# Patient Record
Sex: FEMALE | Race: BLACK OR AFRICAN AMERICAN | NOT HISPANIC OR LATINO
[De-identification: names, ages, dates, MRNs, and addresses within clinical notes are randomized per-mention and may not be internally consistent; named-entity substitution may affect disease eponyms.]

---

## 2019-03-11 PROBLEM — Z00.00 ENCOUNTER FOR PREVENTIVE HEALTH EXAMINATION: Status: ACTIVE | Noted: 2019-03-11

## 2019-03-13 ENCOUNTER — APPOINTMENT (OUTPATIENT)
Dept: ORTHOPEDIC SURGERY | Facility: CLINIC | Age: 44
End: 2019-03-13
Payer: COMMERCIAL

## 2019-03-13 VITALS
BODY MASS INDEX: 39.55 KG/M2 | WEIGHT: 252 LBS | HEART RATE: 80 BPM | HEIGHT: 67 IN | SYSTOLIC BLOOD PRESSURE: 144 MMHG | DIASTOLIC BLOOD PRESSURE: 83 MMHG

## 2019-03-13 DIAGNOSIS — Z78.9 OTHER SPECIFIED HEALTH STATUS: ICD-10-CM

## 2019-03-13 PROCEDURE — 72110 X-RAY EXAM L-2 SPINE 4/>VWS: CPT

## 2019-03-13 PROCEDURE — 99204 OFFICE O/P NEW MOD 45 MIN: CPT

## 2019-03-13 PROCEDURE — 72170 X-RAY EXAM OF PELVIS: CPT | Mod: 59

## 2019-03-26 ENCOUNTER — APPOINTMENT (OUTPATIENT)
Dept: ORTHOPEDIC SURGERY | Facility: CLINIC | Age: 44
End: 2019-03-26
Payer: COMMERCIAL

## 2019-03-26 VITALS
HEIGHT: 67 IN | HEART RATE: 85 BPM | WEIGHT: 250 LBS | BODY MASS INDEX: 39.24 KG/M2 | DIASTOLIC BLOOD PRESSURE: 108 MMHG | SYSTOLIC BLOOD PRESSURE: 161 MMHG

## 2019-03-26 PROCEDURE — 73502 X-RAY EXAM HIP UNI 2-3 VIEWS: CPT

## 2019-03-26 PROCEDURE — 99214 OFFICE O/P EST MOD 30 MIN: CPT

## 2019-04-01 ENCOUNTER — APPOINTMENT (OUTPATIENT)
Dept: ORTHOPEDIC SURGERY | Facility: CLINIC | Age: 44
End: 2019-04-01
Payer: COMMERCIAL

## 2019-04-01 ENCOUNTER — CHART COPY (OUTPATIENT)
Age: 44
End: 2019-04-01

## 2019-04-01 VITALS
HEART RATE: 82 BPM | HEIGHT: 67 IN | WEIGHT: 250 LBS | BODY MASS INDEX: 39.24 KG/M2 | SYSTOLIC BLOOD PRESSURE: 138 MMHG | DIASTOLIC BLOOD PRESSURE: 80 MMHG

## 2019-04-01 PROCEDURE — 99213 OFFICE O/P EST LOW 20 MIN: CPT

## 2019-09-12 ENCOUNTER — CHART COPY (OUTPATIENT)
Age: 44
End: 2019-09-12

## 2019-09-13 ENCOUNTER — CHART COPY (OUTPATIENT)
Age: 44
End: 2019-09-13

## 2019-11-05 ENCOUNTER — CHART COPY (OUTPATIENT)
Age: 44
End: 2019-11-05

## 2020-03-26 ENCOUNTER — TRANSCRIPTION ENCOUNTER (OUTPATIENT)
Age: 45
End: 2020-03-26

## 2020-06-15 ENCOUNTER — APPOINTMENT (OUTPATIENT)
Dept: ORTHOPEDIC SURGERY | Facility: CLINIC | Age: 45
End: 2020-06-15
Payer: COMMERCIAL

## 2020-06-15 VITALS — BODY MASS INDEX: 39.24 KG/M2 | TEMPERATURE: 97.8 F | WEIGHT: 250 LBS | HEIGHT: 67 IN

## 2020-06-15 PROCEDURE — 99213 OFFICE O/P EST LOW 20 MIN: CPT

## 2020-06-15 PROCEDURE — 72110 X-RAY EXAM L-2 SPINE 4/>VWS: CPT

## 2020-06-15 PROCEDURE — 72170 X-RAY EXAM OF PELVIS: CPT | Mod: 59

## 2020-06-15 RX ORDER — HYDROCHLOROTHIAZIDE 25 MG/1
25 TABLET ORAL
Qty: 90 | Refills: 0 | Status: ACTIVE | COMMUNITY
Start: 2020-04-14

## 2020-06-15 RX ORDER — HYDROCODONE BITARTRATE AND ACETAMINOPHEN 7.5; 325 MG/1; MG/1
7.5-325 TABLET ORAL
Qty: 16 | Refills: 0 | Status: COMPLETED | COMMUNITY
Start: 2019-12-22

## 2020-06-15 RX ORDER — TERCONAZOLE 8 MG/G
0.8 CREAM VAGINAL
Qty: 20 | Refills: 0 | Status: COMPLETED | COMMUNITY
Start: 2020-02-17

## 2020-06-15 RX ORDER — LEVOTHYROXINE SODIUM 0.03 MG/1
25 TABLET ORAL
Qty: 90 | Refills: 0 | Status: ACTIVE | COMMUNITY
Start: 2020-04-18

## 2020-06-15 RX ORDER — TIZANIDINE 4 MG/1
4 TABLET ORAL
Qty: 45 | Refills: 0 | Status: ACTIVE | COMMUNITY
Start: 2020-05-26

## 2020-06-15 RX ORDER — DICLOFENAC SODIUM 75 MG/1
75 TABLET, DELAYED RELEASE ORAL
Qty: 60 | Refills: 1 | Status: ACTIVE | COMMUNITY
Start: 2020-06-15 | End: 1900-01-01

## 2020-06-29 ENCOUNTER — RESULT REVIEW (OUTPATIENT)
Age: 45
End: 2020-06-29

## 2020-06-29 ENCOUNTER — OUTPATIENT (OUTPATIENT)
Dept: OUTPATIENT SERVICES | Facility: HOSPITAL | Age: 45
LOS: 1 days | End: 2020-06-29
Payer: COMMERCIAL

## 2020-06-29 ENCOUNTER — APPOINTMENT (OUTPATIENT)
Dept: MRI IMAGING | Facility: CLINIC | Age: 45
End: 2020-06-29
Payer: COMMERCIAL

## 2020-06-29 DIAGNOSIS — M25.551 PAIN IN RIGHT HIP: ICD-10-CM

## 2020-06-29 PROCEDURE — 73721 MRI JNT OF LWR EXTRE W/O DYE: CPT | Mod: 26,RT

## 2020-06-29 PROCEDURE — 73721 MRI JNT OF LWR EXTRE W/O DYE: CPT

## 2020-07-08 ENCOUNTER — APPOINTMENT (OUTPATIENT)
Dept: ORTHOPEDIC SURGERY | Facility: CLINIC | Age: 45
End: 2020-07-08
Payer: COMMERCIAL

## 2020-07-08 VITALS — BODY MASS INDEX: 39.24 KG/M2 | WEIGHT: 250 LBS | HEIGHT: 67 IN | TEMPERATURE: 98 F

## 2020-07-08 DIAGNOSIS — M43.17 SPONDYLOLISTHESIS, LUMBOSACRAL REGION: ICD-10-CM

## 2020-07-08 PROCEDURE — 99214 OFFICE O/P EST MOD 30 MIN: CPT

## 2020-07-28 ENCOUNTER — APPOINTMENT (OUTPATIENT)
Dept: ORTHOPEDIC SURGERY | Facility: CLINIC | Age: 45
End: 2020-07-28

## 2020-08-16 ENCOUNTER — OUTPATIENT (OUTPATIENT)
Dept: OUTPATIENT SERVICES | Facility: HOSPITAL | Age: 45
LOS: 1 days | End: 2020-08-16
Payer: COMMERCIAL

## 2020-08-16 DIAGNOSIS — Z11.59 ENCOUNTER FOR SCREENING FOR OTHER VIRAL DISEASES: ICD-10-CM

## 2020-08-16 PROCEDURE — U0003: CPT

## 2020-08-17 LAB — SARS-COV-2 RNA SPEC QL NAA+PROBE: SIGNIFICANT CHANGE UP

## 2020-08-18 ENCOUNTER — OUTPATIENT (OUTPATIENT)
Dept: OUTPATIENT SERVICES | Facility: HOSPITAL | Age: 45
LOS: 1 days | End: 2020-08-18
Payer: COMMERCIAL

## 2020-08-18 DIAGNOSIS — M54.16 RADICULOPATHY, LUMBAR REGION: ICD-10-CM

## 2020-08-18 PROCEDURE — 77003 FLUOROGUIDE FOR SPINE INJECT: CPT

## 2020-08-18 PROCEDURE — 62323 NJX INTERLAMINAR LMBR/SAC: CPT

## 2021-11-30 ENCOUNTER — APPOINTMENT (OUTPATIENT)
Dept: ORTHOPEDIC SURGERY | Facility: CLINIC | Age: 46
End: 2021-11-30

## 2021-12-03 ENCOUNTER — EMERGENCY (EMERGENCY)
Facility: HOSPITAL | Age: 46
LOS: 1 days | Discharge: HOME | End: 2021-12-03
Attending: EMERGENCY MEDICINE | Admitting: EMERGENCY MEDICINE
Payer: COMMERCIAL

## 2021-12-03 VITALS
OXYGEN SATURATION: 100 % | DIASTOLIC BLOOD PRESSURE: 95 MMHG | SYSTOLIC BLOOD PRESSURE: 159 MMHG | RESPIRATION RATE: 16 BRPM | TEMPERATURE: 98 F | HEART RATE: 81 BPM

## 2021-12-03 LAB
ALBUMIN SERPL ELPH-MCNC: 4 G/DL — SIGNIFICANT CHANGE UP (ref 3.3–5)
ALP SERPL-CCNC: 47 U/L — SIGNIFICANT CHANGE UP (ref 40–120)
ALT FLD-CCNC: 11 U/L — SIGNIFICANT CHANGE UP (ref 4–33)
ANION GAP SERPL CALC-SCNC: 9 MMOL/L — SIGNIFICANT CHANGE UP (ref 7–14)
AST SERPL-CCNC: 13 U/L — SIGNIFICANT CHANGE UP (ref 4–32)
BASOPHILS # BLD AUTO: 0.03 K/UL — SIGNIFICANT CHANGE UP (ref 0–0.2)
BASOPHILS NFR BLD AUTO: 0.4 % — SIGNIFICANT CHANGE UP (ref 0–2)
BILIRUB SERPL-MCNC: 0.3 MG/DL — SIGNIFICANT CHANGE UP (ref 0.2–1.2)
BUN SERPL-MCNC: 13 MG/DL — SIGNIFICANT CHANGE UP (ref 7–23)
CALCIUM SERPL-MCNC: 10 MG/DL — SIGNIFICANT CHANGE UP (ref 8.4–10.5)
CHLORIDE SERPL-SCNC: 103 MMOL/L — SIGNIFICANT CHANGE UP (ref 98–107)
CO2 SERPL-SCNC: 24 MMOL/L — SIGNIFICANT CHANGE UP (ref 22–31)
CREAT SERPL-MCNC: 0.69 MG/DL — SIGNIFICANT CHANGE UP (ref 0.5–1.3)
EOSINOPHIL # BLD AUTO: 0.11 K/UL — SIGNIFICANT CHANGE UP (ref 0–0.5)
EOSINOPHIL NFR BLD AUTO: 1.5 % — SIGNIFICANT CHANGE UP (ref 0–6)
GLUCOSE SERPL-MCNC: 85 MG/DL — SIGNIFICANT CHANGE UP (ref 70–99)
HCT VFR BLD CALC: 34.6 % — SIGNIFICANT CHANGE UP (ref 34.5–45)
HGB BLD-MCNC: 11.7 G/DL — SIGNIFICANT CHANGE UP (ref 11.5–15.5)
IANC: 3.66 K/UL — SIGNIFICANT CHANGE UP (ref 1.5–8.5)
IMM GRANULOCYTES NFR BLD AUTO: 0.3 % — SIGNIFICANT CHANGE UP (ref 0–1.5)
LIDOCAIN IGE QN: 22 U/L — SIGNIFICANT CHANGE UP (ref 7–60)
LYMPHOCYTES # BLD AUTO: 2.96 K/UL — SIGNIFICANT CHANGE UP (ref 1–3.3)
LYMPHOCYTES # BLD AUTO: 39.6 % — SIGNIFICANT CHANGE UP (ref 13–44)
MCHC RBC-ENTMCNC: 31.3 PG — SIGNIFICANT CHANGE UP (ref 27–34)
MCHC RBC-ENTMCNC: 33.8 GM/DL — SIGNIFICANT CHANGE UP (ref 32–36)
MCV RBC AUTO: 92.5 FL — SIGNIFICANT CHANGE UP (ref 80–100)
MONOCYTES # BLD AUTO: 0.69 K/UL — SIGNIFICANT CHANGE UP (ref 0–0.9)
MONOCYTES NFR BLD AUTO: 9.2 % — SIGNIFICANT CHANGE UP (ref 2–14)
NEUTROPHILS # BLD AUTO: 3.66 K/UL — SIGNIFICANT CHANGE UP (ref 1.8–7.4)
NEUTROPHILS NFR BLD AUTO: 49 % — SIGNIFICANT CHANGE UP (ref 43–77)
NRBC # BLD: 0 /100 WBCS — SIGNIFICANT CHANGE UP
NRBC # FLD: 0 K/UL — SIGNIFICANT CHANGE UP
PLATELET # BLD AUTO: 216 K/UL — SIGNIFICANT CHANGE UP (ref 150–400)
POTASSIUM SERPL-MCNC: 3.9 MMOL/L — SIGNIFICANT CHANGE UP (ref 3.5–5.3)
POTASSIUM SERPL-SCNC: 3.9 MMOL/L — SIGNIFICANT CHANGE UP (ref 3.5–5.3)
PROT SERPL-MCNC: 7.5 G/DL — SIGNIFICANT CHANGE UP (ref 6–8.3)
RBC # BLD: 3.74 M/UL — LOW (ref 3.8–5.2)
RBC # FLD: 13 % — SIGNIFICANT CHANGE UP (ref 10.3–14.5)
SODIUM SERPL-SCNC: 136 MMOL/L — SIGNIFICANT CHANGE UP (ref 135–145)
TROPONIN T, HIGH SENSITIVITY RESULT: <6 NG/L — SIGNIFICANT CHANGE UP
TSH SERPL-MCNC: 24.18 UIU/ML — HIGH (ref 0.27–4.2)
WBC # BLD: 7.47 K/UL — SIGNIFICANT CHANGE UP (ref 3.8–10.5)
WBC # FLD AUTO: 7.47 K/UL — SIGNIFICANT CHANGE UP (ref 3.8–10.5)

## 2021-12-03 PROCEDURE — 76705 ECHO EXAM OF ABDOMEN: CPT | Mod: 26

## 2021-12-03 PROCEDURE — 99285 EMERGENCY DEPT VISIT HI MDM: CPT | Mod: 25

## 2021-12-03 PROCEDURE — 93010 ELECTROCARDIOGRAM REPORT: CPT

## 2021-12-03 RX ORDER — ONDANSETRON 8 MG/1
4 TABLET, FILM COATED ORAL ONCE
Refills: 0 | Status: COMPLETED | OUTPATIENT
Start: 2021-12-03 | End: 2021-12-03

## 2021-12-03 RX ORDER — KETOROLAC TROMETHAMINE 30 MG/ML
15 SYRINGE (ML) INJECTION ONCE
Refills: 0 | Status: DISCONTINUED | OUTPATIENT
Start: 2021-12-03 | End: 2021-12-03

## 2021-12-03 RX ORDER — SODIUM CHLORIDE 9 MG/ML
1000 INJECTION INTRAMUSCULAR; INTRAVENOUS; SUBCUTANEOUS ONCE
Refills: 0 | Status: COMPLETED | OUTPATIENT
Start: 2021-12-03 | End: 2021-12-03

## 2021-12-03 RX ORDER — FAMOTIDINE 10 MG/ML
20 INJECTION INTRAVENOUS ONCE
Refills: 0 | Status: COMPLETED | OUTPATIENT
Start: 2021-12-03 | End: 2021-12-03

## 2021-12-03 RX ADMIN — Medication 15 MILLIGRAM(S): at 22:41

## 2021-12-03 RX ADMIN — FAMOTIDINE 20 MILLIGRAM(S): 10 INJECTION INTRAVENOUS at 20:51

## 2021-12-03 RX ADMIN — SODIUM CHLORIDE 1000 MILLILITER(S): 9 INJECTION INTRAMUSCULAR; INTRAVENOUS; SUBCUTANEOUS at 20:49

## 2021-12-03 RX ADMIN — Medication 30 MILLILITER(S): at 20:50

## 2021-12-03 RX ADMIN — ONDANSETRON 4 MILLIGRAM(S): 8 TABLET, FILM COATED ORAL at 20:51

## 2021-12-03 NOTE — ED PROVIDER NOTE - OBJECTIVE STATEMENT
47 y/o female pmh htn, hypothyroidism, obesity c/o abd pain x2 months. Pt admits to intermittent epigastric pain, worse with eating and drinking, associated with nausea. Pt states that today the pain was much worse and constant. Pt states that she now has chest tightness and palpitations w/ the pain. Pt denies sob, v/d, dysuria, hematuria, vaginal bleeding, numbness, tingling, weakness, dizziness, syncope, fever or chills. 47 y/o female pmh htn, hypothyroidism, obesity c/o abd pain x2 months, worse today. Pt admits to intermittent epigastric pain, worse with eating and drinking, associated with nausea. Pt states that today the pain was much worse and constant. Pt states that she now has chest tightness and palpitations w/ the pain. Pt denies sob, v/d, dysuria, hematuria, vaginal bleeding, numbness, tingling, weakness, dizziness, syncope, fever or chills.

## 2021-12-03 NOTE — ED ADULT TRIAGE NOTE - CHIEF COMPLAINT QUOTE
Problem: Patient Care Overview  Goal: Plan of Care Review  Outcome: Ongoing (interventions implemented as appropriate)   11/07/19 4980   Coping/Psychosocial   Plan of Care Reviewed With patient   Plan of Care Review   Progress improving   OTHER   Outcome Summary PATIENT WAS ADMITTED 11/6/19 WITH ABD PAIN N/V. PT C/O HEADACHE THIS PM WITH NEW ORDER FOR TYLENOL. PATIENT DENIES ABD PAIN, N/V. CALL LIGHT IN REACH. NEEDS MET AT THIS TIME.,       Problem: Nausea/Vomiting (Adult)  Goal: Identify Related Risk Factors and Signs and Symptoms  Outcome: Ongoing (interventions implemented as appropriate)         c/o mid upper abdominal pain with chest tightness, nausea/vomiting and palpitations. Pt has been having abdominal pain x 2 months but palpitations and chest tightness is new today. Hx hyperthyroid, HTN

## 2021-12-03 NOTE — ED PROVIDER NOTE - ATTENDING CONTRIBUTION TO CARE
Agree with above- pt presents with intermittent epigastric pain with nausea, worse with eating, worse today. Pt appears uncomfortable, +epigastric TTP, soft, no r/g. Pt feels like the pain radiates up to her chest. Has long h/o palpitations, neg holter monitor in past. On EKG, pt with frequent PVC. Abs symptoms concerning for PUD, GERD, cholelithiasis/cholecystitis. Plan for labs, RUQ US, likely dispo If w/u normal and pain resolved- will arrange for GI.

## 2021-12-03 NOTE — ED PROVIDER NOTE - CLINICAL SUMMARY MEDICAL DECISION MAKING FREE TEXT BOX
45 y/o female pmh htn, hypothyroidism c/o epigastric pain/chest pain- labs, US< pain control, ekg, reassess

## 2021-12-03 NOTE — ED ADULT NURSE NOTE - OBJECTIVE STATEMENT
patient came to Er with c/o mid upper abdominal pain for 2 months  pain is worse today. also c/o epigastric pain worse with eating and drinking and nausea. pt also c/o chest tightness and palpitation from today. alert and oriented. 20 g iv started on right ac. meds given. awaiting for ultrasound

## 2021-12-03 NOTE — ED ADULT NURSE NOTE - CHIEF COMPLAINT QUOTE
c/o mid upper abdominal pain with chest tightness, nausea/vomiting and palpitations. Pt has been having abdominal pain x 2 months but palpitations and chest tightness is new today. Hx hyperthyroid, HTN

## 2021-12-04 VITALS
SYSTOLIC BLOOD PRESSURE: 137 MMHG | OXYGEN SATURATION: 100 % | RESPIRATION RATE: 16 BRPM | HEART RATE: 54 BPM | DIASTOLIC BLOOD PRESSURE: 73 MMHG | TEMPERATURE: 98 F

## 2021-12-04 DIAGNOSIS — Z98.891 HISTORY OF UTERINE SCAR FROM PREVIOUS SURGERY: Chronic | ICD-10-CM

## 2021-12-04 PROCEDURE — G1004: CPT

## 2021-12-04 PROCEDURE — 99236 HOSP IP/OBS SAME DATE HI 85: CPT

## 2021-12-04 PROCEDURE — 78226 HEPATOBILIARY SYSTEM IMAGING: CPT | Mod: 26,GC,MG

## 2021-12-04 RX ORDER — LEVOTHYROXINE SODIUM 125 MCG
50 TABLET ORAL DAILY
Refills: 0 | Status: DISCONTINUED | OUTPATIENT
Start: 2021-12-04 | End: 2021-12-07

## 2021-12-04 RX ORDER — KETOROLAC TROMETHAMINE 30 MG/ML
15 SYRINGE (ML) INJECTION ONCE
Refills: 0 | Status: DISCONTINUED | OUTPATIENT
Start: 2021-12-04 | End: 2021-12-04

## 2021-12-04 RX ORDER — METOPROLOL TARTRATE 50 MG
50 TABLET ORAL DAILY
Refills: 0 | Status: DISCONTINUED | OUTPATIENT
Start: 2021-12-04 | End: 2021-12-07

## 2021-12-04 RX ORDER — HYDROCHLOROTHIAZIDE 25 MG
25 TABLET ORAL DAILY
Refills: 0 | Status: DISCONTINUED | OUTPATIENT
Start: 2021-12-04 | End: 2021-12-07

## 2021-12-04 RX ADMIN — Medication 15 MILLIGRAM(S): at 04:19

## 2021-12-04 RX ADMIN — Medication 25 MILLIGRAM(S): at 05:43

## 2021-12-04 RX ADMIN — Medication 50 MICROGRAM(S): at 05:42

## 2021-12-04 RX ADMIN — Medication 15 MILLIGRAM(S): at 15:00

## 2021-12-04 RX ADMIN — Medication 15 MILLIGRAM(S): at 04:34

## 2021-12-04 RX ADMIN — Medication 15 MILLIGRAM(S): at 14:30

## 2021-12-04 RX ADMIN — Medication 50 MILLIGRAM(S): at 05:43

## 2021-12-04 NOTE — ED CDU PROVIDER INITIAL DAY NOTE - OBJECTIVE STATEMENT
45 y/o female with a hx of HTN, Hypothyroidism, presented to the ER c/o 2 months of RUQ pain.  Pain is worse with eating.  Pt states pain increased today.  Pt reported chest tightness and palpitations with the pain which resolved.  Pt denies fevers, chills, cough, weakness, dizziness, back pain.

## 2021-12-04 NOTE — ED CDU PROVIDER INITIAL DAY NOTE - MEDICAL DECISION MAKING DETAILS
45 y/o female with a hx of HTN, Hypothyroidism, presented to the ER c/o 2 months of RUQ pain.  Pain is worse with eating.  Pt placed in CDU for pain control, surgery consult, HIDA scan.

## 2021-12-04 NOTE — ED CDU PROVIDER DISPOSITION NOTE - PATIENT PORTAL LINK FT
You can access the FollowMyHealth Patient Portal offered by [facility] by registering at the following website: [email or web address]. By joining [employer]’s FollowMyHealth portal, you will also be able to view your health information using other applications (apps) compatible with our system.

## 2021-12-04 NOTE — ED CDU PROVIDER INITIAL DAY NOTE - WET READ LAUNCH FT
Met with patient discussed role of ACre Management  Patient reside with his wife and family in a 2 story home with 0 RUPAL  Ailyn Thompson He uses no assistive device and is employed  He has Home O2 thru [employer]  He plans to return home and anticipates no discharge needs  There are no Wet Read(s) to document.

## 2021-12-04 NOTE — ED CDU PROVIDER INITIAL DAY NOTE - PROGRESS NOTE DETAILS
HIDA negative, pt started to have 7/10 upper abdominal pain again, surgery consulted- states unlikely cholecystitis and recommended GI follow up.  Pt tolerated PO.  Noted to have PVCs on EKG- will refer to cardiology for follow up.

## 2021-12-04 NOTE — CONSULT NOTE ADULT - SUBJECTIVE AND OBJECTIVE BOX
GENERAL SURGERY CONSULT NOTE  Consulting surgical team: B Team Surgery  Consulting attending: Dr. Mcgee    HPI:  47yo F with Hx HTN, hypothyroidism, obesity who presented with severe epigastric abdominal pain for 1 day. She states that she has had epigastric pain for 2 months, but it acutely worsened yesterday at 830am. The pain was sharp, constant, and non-radiating. Her pain was associated with nausea and she made herself throw up once, without relief; her emesis was bilious, but non-bloody. She has never had any similar episodes in the past. She does not have diarrhea and does not have sick contacts. She did not eat anything out of the ordinary yesterday. She has never had previous episodes of diverticulitis and has never been diagnosed with peptic ulcer disease. She has not had an endoscopy or colonoscopy in the past. She is passing flatus and having BM. On presentation, patient was afebrile, hemodynamically stable. Labs within normal limits (WBC 8.65, LFTs WNL). RUQ u/s demonstrated cholelithiasis without evidence of acute cholecystitis. HIDA was also negative for acute cholecystitis.     PAST MEDICAL HISTORY:  HTN (hypertension)  Hypothyroidism    PAST SURGICAL HISTORY:  S/P     MEDICATIONS:  hydrochlorothiazide 25 milliGRAM(s) Oral daily  levothyroxine 50 MICROGram(s) Oral daily  metoprolol succinate ER 50 milliGRAM(s) Oral daily    ALLERGIES:  gabapentin (Unknown)    VITALS & I/Os:  Vital Signs Last 24 Hrs  T(C): 36.7 (04 Dec 2021 17:54), Max: 37.1 (04 Dec 2021 05:35)  T(F): 98.1 (04 Dec 2021 17:54), Max: 98.7 (04 Dec 2021 05:35)  HR: 54 (04 Dec 2021 17:54) (53 - 87)  BP: 137/73 (04 Dec 2021 17:54) (114/53 - 159/95)  RR: 16 (04 Dec 2021 17:54) (15 - 16)  SpO2: 100% (04 Dec 2021 17:54) (100% - 100%)        PHYSICAL EXAM:  GEN: resting in bed comfortably in NAD  NEURO: awake, alert  CV: warm, well-perfused  RESP: no increased WOB  ABD: soft, non-distended, tender in the epigastric region and b/l LQ only without rebound tenderness or guarding; negative Ware's sign, NO RUQ tenderness  EXTR: no gross deformities; spontaneous movement in b/l U/L extrem     LABS:             11.7   7.47  )-----------( 216      ( 03 Dec 2021 21:02 )             34.6     136  |  103  |  13  ----------------------------<  85  3.9   |  24  |  0.69    Ca    10.0      03 Dec 2021 21:02    TPro  7.5  /  Alb  4.0  /  TBili  0.3  /  DBili  x   /  AST  13  /  ALT  11  /  AlkPhos  47  12-03      IMAGING:    RUQ u/s - 12/3:   FINDINGS:  Liver: Within normal limits.  Bile ducts: Normal caliber. Common bile duct measures 2 mm.  Gallbladder: Cholelithiasis. Normal gallbladder wall thickness. Negative sonographic Ware sign.  Pancreas: Poorly visualized.  Right kidney: 10.6 cm. No hydronephrosis.  Ascites: None.  IVC: Visualized portions are within normal limits.    IMPRESSION:  Cholelithiasis without sonographic evidence for acute cholecystitis.    HIDA - :   FINDINGS: There is prompt, homogeneous uptake of radiotracer by the hepatocytes. Activity is first seen in the gallbladder and bowel at 30 minutes. There is good clearance of activity from the liver by the end of the study.    IMPRESSION: Hepatobiliary scan demonstrates:  No evidence of acute cholecystitis.

## 2021-12-04 NOTE — ED CDU PROVIDER INITIAL DAY NOTE - DETAILS
47 y/o female with a hx of HTN, Hypothyroidism, presented to the ER c/o 2 months of RUQ pain.  Pain is worse with eating.  Pt placed in CDU for pain control, surgery consult, HIDA scan.

## 2021-12-04 NOTE — CONSULT NOTE ADULT - ASSESSMENT
45yo F with Hx HTN, hypothyroidism, obesity who presented with severe epigastric abdominal pain for 1 day, without evidence of acute cholecystitis. General Surgery consulted to evaluate for acute cholecystitis. As patient does not have RUQ pain, has no fever or elevated WBC, and does not have imaging evidence of acute cholecystitis, patient is unlikely to have cholecystitis at this time. Differential diagnosis includes peptic ulcer disease.     PLAN:   - No acute surgical intervention at this time  - No contraindications to discharge if patient can tolerate PO diet at this time  - Recommend OP f/u with GI for possible endoscopy and workup to evaluate for peptic ulcer disease    Discussed with Dr. Talamantes, PGY-2  B Team Surgery  #[id number]

## 2021-12-04 NOTE — ED CDU PROVIDER DISPOSITION NOTE - ATTENDING CONTRIBUTION TO CARE
47 y/o female with a hx of HTN, Hypothyroidism, presented to the ER c/o 2 months of RUQ pain.  Pain is worse with eating.  Pt placed in CDU for pain control, surgery consult, HIDA scan.  HIDA was negative, pt cleared by surgery for discharge and follow up with GI outpatient.

## 2021-12-04 NOTE — ED CDU PROVIDER DISPOSITION NOTE - NSFOLLOWUPINSTRUCTIONS_ED_ALL_ED_FT
Avoid any fatty, greasy foods.  You may take pepcid 20 mg 2x/day.  Follow up with a GI specialist within 1 week.  Follow up with a cardiologist within one week.  Someone will call you to set up appointments.  Return to ED for any worsening abdominal pain, vomiting, fever, chest pain, shortness of breath.

## 2021-12-08 PROBLEM — E03.9 HYPOTHYROIDISM, UNSPECIFIED: Chronic | Status: ACTIVE | Noted: 2021-12-03

## 2021-12-08 PROBLEM — I10 ESSENTIAL (PRIMARY) HYPERTENSION: Chronic | Status: ACTIVE | Noted: 2021-12-03

## 2021-12-10 DIAGNOSIS — R10.13 EPIGASTRIC PAIN: ICD-10-CM

## 2021-12-13 ENCOUNTER — NON-APPOINTMENT (OUTPATIENT)
Age: 46
End: 2021-12-13

## 2021-12-13 ENCOUNTER — APPOINTMENT (OUTPATIENT)
Dept: GASTROENTEROLOGY | Facility: CLINIC | Age: 46
End: 2021-12-13
Payer: COMMERCIAL

## 2021-12-13 ENCOUNTER — APPOINTMENT (OUTPATIENT)
Dept: CARDIOLOGY | Facility: CLINIC | Age: 46
End: 2021-12-13
Payer: COMMERCIAL

## 2021-12-13 VITALS
WEIGHT: 252 LBS | SYSTOLIC BLOOD PRESSURE: 122 MMHG | DIASTOLIC BLOOD PRESSURE: 78 MMHG | BODY MASS INDEX: 39.55 KG/M2 | HEIGHT: 67 IN | HEART RATE: 55 BPM

## 2021-12-13 VITALS
DIASTOLIC BLOOD PRESSURE: 86 MMHG | HEART RATE: 52 BPM | SYSTOLIC BLOOD PRESSURE: 129 MMHG | TEMPERATURE: 97.5 F | BODY MASS INDEX: 32.18 KG/M2 | RESPIRATION RATE: 16 BRPM | WEIGHT: 205 LBS | HEIGHT: 67 IN | OXYGEN SATURATION: 100 %

## 2021-12-13 DIAGNOSIS — I49.3 VENTRICULAR PREMATURE DEPOLARIZATION: ICD-10-CM

## 2021-12-13 DIAGNOSIS — E03.9 HYPOTHYROIDISM, UNSPECIFIED: ICD-10-CM

## 2021-12-13 DIAGNOSIS — I10 ESSENTIAL (PRIMARY) HYPERTENSION: ICD-10-CM

## 2021-12-13 DIAGNOSIS — R94.31 ABNORMAL ELECTROCARDIOGRAM [ECG] [EKG]: ICD-10-CM

## 2021-12-13 DIAGNOSIS — E66.9 OBESITY, UNSPECIFIED: ICD-10-CM

## 2021-12-13 DIAGNOSIS — Z12.11 ENCOUNTER FOR SCREENING FOR MALIGNANT NEOPLASM OF COLON: ICD-10-CM

## 2021-12-13 DIAGNOSIS — R00.2 PALPITATIONS: ICD-10-CM

## 2021-12-13 DIAGNOSIS — Z78.9 OTHER SPECIFIED HEALTH STATUS: ICD-10-CM

## 2021-12-13 DIAGNOSIS — L80 VITILIGO: ICD-10-CM

## 2021-12-13 PROCEDURE — 82274 ASSAY TEST FOR BLOOD FECAL: CPT | Mod: QW

## 2021-12-13 PROCEDURE — 99204 OFFICE O/P NEW MOD 45 MIN: CPT

## 2021-12-13 PROCEDURE — 93000 ELECTROCARDIOGRAM COMPLETE: CPT

## 2021-12-13 RX ORDER — AMOXICILLIN AND CLAVULANATE POTASSIUM 875; 125 MG/1; MG/1
875-125 TABLET, COATED ORAL
Qty: 20 | Refills: 0 | Status: DISCONTINUED | COMMUNITY
Start: 2020-01-30 | End: 2021-12-13

## 2021-12-13 RX ORDER — METOPROLOL SUCCINATE 50 MG/1
50 TABLET, EXTENDED RELEASE ORAL
Qty: 90 | Refills: 0 | Status: DISCONTINUED | COMMUNITY
Start: 2020-04-14 | End: 2021-12-13

## 2021-12-13 RX ORDER — DICLOFENAC SODIUM 75 MG/1
75 TABLET, DELAYED RELEASE ORAL
Qty: 30 | Refills: 1 | Status: DISCONTINUED | COMMUNITY
Start: 2019-03-13 | End: 2021-12-13

## 2021-12-13 RX ORDER — FLUCONAZOLE 150 MG/1
150 TABLET ORAL
Qty: 3 | Refills: 0 | Status: DISCONTINUED | COMMUNITY
Start: 2020-02-17 | End: 2021-12-13

## 2021-12-13 RX ORDER — OSELTAMIVIR PHOSPHATE 75 MG/1
75 CAPSULE ORAL
Qty: 10 | Refills: 0 | Status: DISCONTINUED | COMMUNITY
Start: 2020-01-30 | End: 2021-12-13

## 2021-12-13 NOTE — ASSESSMENT
[FreeTextEntry1] : 1.  Cholelithiasis-recent attack of biliary colic without evidence for acute cholecystitis.\par 2.  Colon cancer screening-rule out colon polyps.\par 3.  Hypertension.\par 4.  Obesity.\par 5.  Hypothyroidism with thyromegaly.\par 6.  History of kidney stones.\par 7.  History of PVCs.\par \par Plan:\par 1.  Blood test results from [facility] were reviewed.\par 2.  The patient was advised to monitor her symptoms, and if she continues to have recurrent attacks of biliary pain, an elective cholecystectomy would be advised.\par 3.  The patient was advised to schedule a colonoscopy.  The procedure, material risks (including bleeding, perforation, anesthesia-related complications, rare complications, death and risk of missing a lesion), benefits (including finding a polyp, cancer, inflammatory bowel disease or other lesions) and alternatives (including stool testing and imaging) were discussed with the patient at length.  The [employer] Brochure was given. The MiraLax preparation was advised.\par 4.  The patient was strongly urged to lose weight, and she has already started a low-fat diet.

## 2021-12-13 NOTE — CONSULT LETTER
[Dear  ___] : Dear  [unfilled], [Consult Letter:] : I had the pleasure of evaluating your patient, [unfilled]. [( Thank you for referring [unfilled] for consultation for _____ )] : Thank you for referring [unfilled] for consultation for [unfilled] [Please see my note below.] : Please see my note below. [Consult Closing:] : Thank you very much for allowing me to participate in the care of this patient.  If you have any questions, please do not hesitate to contact me. [Sincerely,] : Sincerely, [FreeTextEntry3] : Buster Cordero MD

## 2021-12-13 NOTE — DISCUSSION/SUMMARY
[Patient] : the patient [Risks] : risks [Benefits] : benefits [Alternatives] : alternatives [With Me] : with me [___ Week(s)] : in [unfilled] week(s) [FreeTextEntry1] : #Palpitations\par -Present for many years, prior monitoring neg\par -Multiple PVCs on EKG from ED visit\par -Will obtain event monitor (7 days-pt has symptoms every 3-4 days), and TTE to exclude structural HD\par -Advised TLC incl weight loss and activity 30 mins 5X/week\par -Pt declines referral for sleep study and bariatric surgery

## 2021-12-13 NOTE — PHYSICAL EXAM
[General Appearance - Alert] : alert [General Appearance - In No Acute Distress] : in no acute distress [General Appearance - Well Developed] : well developed [General Appearance - Well-Appearing] : healthy appearing [Sclera] : the sclera and conjunctiva were normal [PERRL With Normal Accommodation] : pupils were equal in size, round, and reactive to light [Extraocular Movements] : extraocular movements were intact [Strabismus] : no strabismus was seen [Optic Disc Abnormality] : the optic disc were normal in size and color [Retina] : no retinal hemorrhages, vessel changes or exudates seen on fundoscopic exam [Outer Ear] : the ears and nose were normal in appearance [Examination Of The Oral Cavity] : the lips and gums were normal [Nasal Cavity] : the nasal mucosa and septum were normal [Oropharynx] : the oropharynx was normal [Neck Appearance] : the appearance of the neck was normal [Neck Cervical Mass (___cm)] : no neck mass was observed [Jugular Venous Distention Increased] : there was no jugular-venous distention [Auscultation Breath Sounds / Voice Sounds] : lungs were clear to auscultation bilaterally [Lungs Percussion] : the lungs were normal to percussion [Heart Rate And Rhythm] : heart rate was normal and rhythm regular [Heart Sounds] : normal S1 and S2 [Heart Sounds Gallop] : no gallops [Murmurs] : no murmurs [Heart Sounds Pericardial Friction Rub] : no pericardial rub [Arterial Pulses Carotid] : carotid pulses were normal with no bruits [Full Pulse] : the pedal pulses are present [Edema] : there was no peripheral edema [Veins - Varicosity Changes] : there were no varicosital changes [Bowel Sounds] : normal bowel sounds [Abdomen Soft] : soft [Abdomen Tenderness] : non-tender [Abdomen Mass (___ Cm)] : no abdominal mass palpated [Abdomen Hernia] : no hernia was discovered [Normal Sphincter Tone] : normal sphincter tone [No Rectal Mass] : no rectal mass [Occult Blood Positive] : stool was negative for occult blood [No CVA Tenderness] : no ~M costovertebral angle tenderness [No Spinal Tenderness] : no spinal tenderness [Nail Clubbing] : no clubbing  or cyanosis of the fingernails [Involuntary Movements] : no involuntary movements were seen [Musculoskeletal - Swelling] : no joint swelling seen [Skin Turgor] : normal skin turgor [] : no rash [FreeTextEntry1] : Vitiligo involving the hands, perianal region and feet [No Focal Deficits] : no focal deficits [Oriented To Time, Place, And Person] : oriented to person, place, and time [Impaired Insight] : insight and judgment were intact [Affect] : the affect was normal [Mood] : the mood was normal

## 2021-12-13 NOTE — PHYSICAL EXAM
[Well Developed] : well developed [Well Nourished] : well nourished [No Acute Distress] : no acute distress [Normal Conjunctiva] : normal conjunctiva [Normal Venous Pressure] : normal venous pressure [No Carotid Bruit] : no carotid bruit [Normal S1, S2] : normal S1, S2 [No Murmur] : no murmur [Clear Lung Fields] : clear lung fields [Good Air Entry] : good air entry [No Respiratory Distress] : no respiratory distress  [Soft] : abdomen soft [Normal Bowel Sounds] : normal bowel sounds [Normal Gait] : normal gait [No Edema] : no edema [Normal Radial B/L] : normal radial B/L [No Rash] : no rash [No Skin Lesions] : no skin lesions [Moves all extremities] : moves all extremities [No Focal Deficits] : no focal deficits [Normal Speech] : normal speech [Alert and Oriented] : alert and oriented [Normal memory] : normal memory

## 2021-12-13 NOTE — HISTORY OF PRESENT ILLNESS
[FreeTextEntry1] : 46/F AA, with morbid obesity presents with palpitations for 5-6 years. Pt had a recent ED visit for an acute abdomen-and was noted to have multiple PVCs on EKG. Pt reportedly had palpitations for a long time and had a 'monitor' done-which reprotedly was normal. Reports good exercise tolerance-able to ambulate 3-4 blocks/2 flights of stairs w/o issue.

## 2021-12-13 NOTE — HISTORY OF PRESENT ILLNESS
[FreeTextEntry1] : This 46-year-old -American woman is referred for evaluation of epigastric pain and nausea that occurred acutely about 10 days ago.  The pain lasted all day, and she spent the evening in the [facility] ER.  She forced herself to vomit.  Abdominal sonogram revealed cholelithiasis without sonographic evidence for acute cholecystitis.  Hepatobiliary scan did not reveal evidence for acute cholecystitis she was discharged from the ER and advised to follow-up in our office.  Her mother had a cholecystectomy.  She denies change in bowel habits, blood in the stool or heartburn.  The recent episode of epigastric pain and nausea was new, and she never had a similar episode in the past.  She has a history of hypertension, for which she takes metoprolol and HCTZ.  She is hypothyroid, and is on levothyroxine.  She has a problem with her back and believes that she needs surgery.  She takes a muscle relaxant for this issue.  There is no family history of colon polyps or colon cancer.  She has never had a colonoscopy.  She was vaccinated against COVID-19 with the Pfizer vaccine.  Her parents are healthy.  Her maternal grandmother had breast cancer.  One of her 4 brothers has stomach problems.  She is , and her middle son has asthma.  She is employed as a school safety agent.  She never smoked and occasionally drinks alcohol.  She denies illicit drug use.  No allergies are known.

## 2021-12-13 NOTE — REVIEW OF SYSTEMS
[Palpitations] : palpitations [Fever] : no fever [Chills] : no chills [Blurry Vision] : no blurred vision [Earache] : no earache [SOB] : no shortness of breath [Dyspnea on exertion] : not dyspnea during exertion [Chest Discomfort] : no chest discomfort [Lower Ext Edema] : no extremity edema [Leg Claudication] : no intermittent leg claudication [Orthopnea] : no orthopnea [PND] : no PND [Syncope] : no syncope [Cough] : no cough [Abdominal Pain] : no abdominal pain [Constipation] : no constipation [Dysuria] : no dysuria [Joint Pain] : no joint pain [Rash] : no rash [Itching] : no itching [Dizziness] : no dizziness [Tremor] : no tremor was seen [Confusion] : no confusion was observed [Memory Lapses Or Loss] : no memory lapses or loss [Easy Bleeding] : no tendency for easy bleeding

## 2022-01-01 DIAGNOSIS — Z01.818 ENCOUNTER FOR OTHER PREPROCEDURAL EXAMINATION: ICD-10-CM

## 2022-01-08 LAB — SARS-COV-2 N GENE NPH QL NAA+PROBE: NOT DETECTED

## 2022-01-10 ENCOUNTER — APPOINTMENT (OUTPATIENT)
Dept: GASTROENTEROLOGY | Facility: CLINIC | Age: 47
End: 2022-01-10
Payer: COMMERCIAL

## 2022-01-10 PROCEDURE — 45378 DIAGNOSTIC COLONOSCOPY: CPT

## 2022-01-10 PROCEDURE — 84703 CHORIONIC GONADOTROPIN ASSAY: CPT | Mod: 59,QW

## 2022-02-15 ENCOUNTER — APPOINTMENT (OUTPATIENT)
Dept: CARDIOLOGY | Facility: CLINIC | Age: 47
End: 2022-02-15
Payer: COMMERCIAL

## 2022-02-15 ENCOUNTER — NON-APPOINTMENT (OUTPATIENT)
Age: 47
End: 2022-02-15

## 2022-02-15 VITALS
DIASTOLIC BLOOD PRESSURE: 81 MMHG | OXYGEN SATURATION: 100 % | SYSTOLIC BLOOD PRESSURE: 144 MMHG | WEIGHT: 250 LBS | HEART RATE: 49 BPM | BODY MASS INDEX: 39.24 KG/M2 | HEIGHT: 67 IN

## 2022-02-15 PROCEDURE — 99214 OFFICE O/P EST MOD 30 MIN: CPT

## 2022-02-15 PROCEDURE — 93000 ELECTROCARDIOGRAM COMPLETE: CPT

## 2022-02-15 RX ORDER — CYCLOBENZAPRINE HYDROCHLORIDE 7.5 MG/1
TABLET, FILM COATED ORAL
Refills: 0 | Status: DISCONTINUED | COMMUNITY
End: 2022-02-15

## 2022-02-15 RX ORDER — METOPROLOL TARTRATE 25 MG/1
25 TABLET, FILM COATED ORAL TWICE DAILY
Qty: 120 | Refills: 3 | Status: ACTIVE | COMMUNITY
Start: 2022-02-15 | End: 1900-01-01

## 2022-02-15 RX ORDER — METHOCARBAMOL 500 MG/1
500 TABLET, FILM COATED ORAL
Qty: 60 | Refills: 0 | Status: DISCONTINUED | COMMUNITY
Start: 2020-01-11 | End: 2022-02-15

## 2022-03-06 RX ORDER — METOPROLOL SUCCINATE 25 MG/1
25 TABLET, EXTENDED RELEASE ORAL DAILY
Qty: 90 | Refills: 1 | Status: DISCONTINUED | COMMUNITY
Start: 2021-12-13 | End: 2022-03-06

## 2022-03-06 NOTE — DISCUSSION/SUMMARY
[Patient] : the patient [Risks] : risks [Benefits] : benefits [Alternatives] : alternatives [With Me] : with me [___ Week(s)] : in [unfilled] week(s) [FreeTextEntry1] : 45 yo F with Hypothyroidism, Lumbar disk bulge, HTN, gallstones,  palpitations likely secondary to PVCs here for follow up\par \par --TSH in December was 24, Levothyroxine was adjusted by her PMD from 25 to 50 mcg daily. Discussed with patient that it would be ideal to assess PVCs when TSH is normal. Advised her to follow up closely for TSH management, provided names of Endocrinologists. \par - Will change Metoprolol from Succinate to Tartrate BID \par - Advised her to have TTE\par - If TSH is normalized and symptoms do not improve with increase of Metoprolol, will consider EP referral for PVCs\par - BP is mildly elevated today, continue HCTZ, will monitor with TSH management

## 2022-03-06 NOTE — HISTORY OF PRESENT ILLNESS
[FreeTextEntry1] : Ms. Merrill is a 45 yo F with Hypothyroidism, Lumbar disk bulge, gallstones, HTN,  palpitations likely secondary to PVCs here for follow up. She states that on a daily basis she feels palpitations, a feeling of anxiety, jumping in her chest, and nausea.  A Holter monitor was placed during her last visit in this office with Dr. Neal, findings revealed a PVC burden of < 1% which correlated with her symptoms. She did not wear the monitor for the entire time.

## 2022-03-06 NOTE — PHYSICAL EXAM
[Well Developed] : well developed [Well Nourished] : well nourished [No Acute Distress] : no acute distress [Normal Conjunctiva] : normal conjunctiva [Normal Venous Pressure] : normal venous pressure [No Carotid Bruit] : no carotid bruit [Normal S1, S2] : normal S1, S2 [No Murmur] : no murmur [Clear Lung Fields] : clear lung fields [Good Air Entry] : good air entry [No Respiratory Distress] : no respiratory distress  [Soft] : abdomen soft [Normal Bowel Sounds] : normal bowel sounds [Normal Gait] : normal gait [No Edema] : no edema [Normal Radial B/L] : normal radial B/L [No Rash] : no rash [No Skin Lesions] : no skin lesions [Moves all extremities] : moves all extremities [No Focal Deficits] : no focal deficits [Normal Speech] : normal speech [Alert and Oriented] : alert and oriented

## 2022-03-06 NOTE — REVIEW OF SYSTEMS
[Feeling Fatigued] : feeling fatigued [Palpitations] : palpitations [Nausea] : nausea [Anxiety] : anxiety [Negative] : Neurological [Fever] : no fever [Chills] : no chills [Blurry Vision] : no blurred vision [Earache] : no earache [SOB] : no shortness of breath [Dyspnea on exertion] : not dyspnea during exertion [Chest Discomfort] : no chest discomfort [Lower Ext Edema] : no extremity edema [Leg Claudication] : no intermittent leg claudication [Orthopnea] : no orthopnea [PND] : no PND [Syncope] : no syncope [Cough] : no cough [Abdominal Pain] : no abdominal pain [Constipation] : no constipation [Dysuria] : no dysuria [Joint Pain] : no joint pain [Rash] : no rash [Itching] : no itching [Dizziness] : no dizziness [Tremor] : no tremor was seen [Confusion] : no confusion was observed [Memory Lapses Or Loss] : no memory lapses or loss [Easy Bleeding] : no tendency for easy bleeding

## 2022-03-16 ENCOUNTER — APPOINTMENT (OUTPATIENT)
Dept: CV DIAGNOSITCS | Facility: HOSPITAL | Age: 47
End: 2022-03-16

## 2023-01-30 ENCOUNTER — EMERGENCY (EMERGENCY)
Facility: HOSPITAL | Age: 48
LOS: 1 days | Discharge: HOME | End: 2023-01-30
Attending: EMERGENCY MEDICINE | Admitting: EMERGENCY MEDICINE
Payer: COMMERCIAL

## 2023-01-30 VITALS
HEART RATE: 69 BPM | DIASTOLIC BLOOD PRESSURE: 98 MMHG | OXYGEN SATURATION: 100 % | RESPIRATION RATE: 16 BRPM | TEMPERATURE: 98 F | SYSTOLIC BLOOD PRESSURE: 175 MMHG

## 2023-01-30 VITALS
SYSTOLIC BLOOD PRESSURE: 173 MMHG | OXYGEN SATURATION: 100 % | TEMPERATURE: 99 F | RESPIRATION RATE: 16 BRPM | DIASTOLIC BLOOD PRESSURE: 91 MMHG | HEART RATE: 60 BPM

## 2023-01-30 DIAGNOSIS — Z98.891 HISTORY OF UTERINE SCAR FROM PREVIOUS SURGERY: Chronic | ICD-10-CM

## 2023-01-30 LAB
ALBUMIN SERPL ELPH-MCNC: 3.8 G/DL — SIGNIFICANT CHANGE UP (ref 3.3–5)
ALP SERPL-CCNC: 63 U/L — SIGNIFICANT CHANGE UP (ref 40–120)
ALT FLD-CCNC: 9 U/L — SIGNIFICANT CHANGE UP (ref 4–33)
ANION GAP SERPL CALC-SCNC: 10 MMOL/L — SIGNIFICANT CHANGE UP (ref 7–14)
AST SERPL-CCNC: 23 U/L — SIGNIFICANT CHANGE UP (ref 4–32)
BASOPHILS # BLD AUTO: 0.03 K/UL — SIGNIFICANT CHANGE UP (ref 0–0.2)
BASOPHILS NFR BLD AUTO: 0.5 % — SIGNIFICANT CHANGE UP (ref 0–2)
BILIRUB SERPL-MCNC: 0.5 MG/DL — SIGNIFICANT CHANGE UP (ref 0.2–1.2)
BLD GP AB SCN SERPL QL: NEGATIVE — SIGNIFICANT CHANGE UP
BUN SERPL-MCNC: 8 MG/DL — SIGNIFICANT CHANGE UP (ref 7–23)
CALCIUM SERPL-MCNC: 9.4 MG/DL — SIGNIFICANT CHANGE UP (ref 8.4–10.5)
CHLORIDE SERPL-SCNC: 104 MMOL/L — SIGNIFICANT CHANGE UP (ref 98–107)
CO2 SERPL-SCNC: 20 MMOL/L — LOW (ref 22–31)
CREAT SERPL-MCNC: 0.52 MG/DL — SIGNIFICANT CHANGE UP (ref 0.5–1.3)
EGFR: 115 ML/MIN/1.73M2 — SIGNIFICANT CHANGE UP
EOSINOPHIL # BLD AUTO: 0.07 K/UL — SIGNIFICANT CHANGE UP (ref 0–0.5)
EOSINOPHIL NFR BLD AUTO: 1.2 % — SIGNIFICANT CHANGE UP (ref 0–6)
GLUCOSE SERPL-MCNC: 75 MG/DL — SIGNIFICANT CHANGE UP (ref 70–99)
HCT VFR BLD CALC: 32.5 % — LOW (ref 34.5–45)
HGB BLD-MCNC: 10.2 G/DL — LOW (ref 11.5–15.5)
IANC: 2.47 K/UL — SIGNIFICANT CHANGE UP (ref 1.8–7.4)
IMM GRANULOCYTES NFR BLD AUTO: 0.3 % — SIGNIFICANT CHANGE UP (ref 0–0.9)
LYMPHOCYTES # BLD AUTO: 2.65 K/UL — SIGNIFICANT CHANGE UP (ref 1–3.3)
LYMPHOCYTES # BLD AUTO: 46.3 % — HIGH (ref 13–44)
MCHC RBC-ENTMCNC: 28.7 PG — SIGNIFICANT CHANGE UP (ref 27–34)
MCHC RBC-ENTMCNC: 31.4 GM/DL — LOW (ref 32–36)
MCV RBC AUTO: 91.5 FL — SIGNIFICANT CHANGE UP (ref 80–100)
MONOCYTES # BLD AUTO: 0.48 K/UL — SIGNIFICANT CHANGE UP (ref 0–0.9)
MONOCYTES NFR BLD AUTO: 8.4 % — SIGNIFICANT CHANGE UP (ref 2–14)
NEUTROPHILS # BLD AUTO: 2.47 K/UL — SIGNIFICANT CHANGE UP (ref 1.8–7.4)
NEUTROPHILS NFR BLD AUTO: 43.3 % — SIGNIFICANT CHANGE UP (ref 43–77)
NRBC # BLD: 0 /100 WBCS — SIGNIFICANT CHANGE UP (ref 0–0)
NRBC # FLD: 0 K/UL — SIGNIFICANT CHANGE UP (ref 0–0)
OB PNL STL: NEGATIVE — SIGNIFICANT CHANGE UP
PLATELET # BLD AUTO: 190 K/UL — SIGNIFICANT CHANGE UP (ref 150–400)
POTASSIUM SERPL-MCNC: 4.4 MMOL/L — SIGNIFICANT CHANGE UP (ref 3.5–5.3)
POTASSIUM SERPL-SCNC: 4.4 MMOL/L — SIGNIFICANT CHANGE UP (ref 3.5–5.3)
PROT SERPL-MCNC: 7.3 G/DL — SIGNIFICANT CHANGE UP (ref 6–8.3)
RBC # BLD: 3.55 M/UL — LOW (ref 3.8–5.2)
RBC # FLD: 13.4 % — SIGNIFICANT CHANGE UP (ref 10.3–14.5)
RH IG SCN BLD-IMP: POSITIVE — SIGNIFICANT CHANGE UP
SODIUM SERPL-SCNC: 134 MMOL/L — LOW (ref 135–145)
WBC # BLD: 5.72 K/UL — SIGNIFICANT CHANGE UP (ref 3.8–10.5)
WBC # FLD AUTO: 5.72 K/UL — SIGNIFICANT CHANGE UP (ref 3.8–10.5)

## 2023-01-30 PROCEDURE — 99284 EMERGENCY DEPT VISIT MOD MDM: CPT

## 2023-01-30 NOTE — ED PROVIDER NOTE - OBJECTIVE STATEMENT
48-year-old female with history of hypertension, hypothyroidism, vitiligo, anemia (on iron supplementation and B12 shots, not compliant with iron medication) presents with 1 month of lower abdominal pain.  States feels bloated after eating, has multiple episodes of urination at times and multiple episodes of defecation at other times.  States over the last 3 to 4 days it is worsened, took Pepto-Bismol without relief here for evaluation after noticing 2-3 episodes of dark tarry stool.  Denies chest pain, shortness of breath, syncope, fatigue

## 2023-01-30 NOTE — ED ADULT TRIAGE NOTE - CHIEF COMPLAINT QUOTE
c/o left upper abdominal pain radiating into mid abdomen x 1 month with nausea. Pt states stool was black yesterday. Hx hypothyroid, HTN, gallstones

## 2023-01-30 NOTE — ED PROVIDER NOTE - CLINICAL SUMMARY MEDICAL DECISION MAKING FREE TEXT BOX
Impression  Abdominal exam benign, no tenderness no concern for surgical pathology at this time, will get labs including type and screen given report of black stool, guaiac sent (guaiac gray//green stool) will reevaluate after labs and medications

## 2023-01-30 NOTE — ED ADULT NURSE NOTE - OBJECTIVE STATEMENT
Pt c/o abdominal pain, bloating dark stool.  PMH of anemia, HTN, thyroid. Labs sent. continue tomonitor.

## 2023-01-30 NOTE — ED PROVIDER NOTE - NSFOLLOWUPINSTRUCTIONS_ED_ALL_ED_FT
Please follow up with your gastroenterologist    I have attached your lab results which show anemia but not a low blood count requiring transfusion, additionally your stool showed no evidence of blood.    Return to the ER for any worsening of symptoms which include but not limited to chest pain, trouble breathing, passing out or blood in stool.

## 2023-01-30 NOTE — ED PROVIDER NOTE - PATIENT PORTAL LINK FT
You can access the FollowMyHealth Patient Portal offered by [facility] by registering at the following website: [email or web address]. By joining "[employer]’s FollowMyHealth portal, you will also be able to view your health information using other applications (apps) compatible with our system.

## 2023-01-30 NOTE — ED PROVIDER NOTE - PHYSICAL EXAMINATION
Physical exam  Gen: pt well appearing in no respiratory distress  vital signs stable  NCAT  Lungs: CTAB/L  Cardiac: s1 s2 no m/r/g  abdomen: soft/NT/ND  ext: no edema  Neuro: CNs intact 5/5 motor UE and LE; sensation intact; gait stable  skin: no rash

## 2023-03-13 ENCOUNTER — APPOINTMENT (OUTPATIENT)
Dept: SURGERY | Facility: CLINIC | Age: 48
End: 2023-03-13
Payer: COMMERCIAL

## 2023-03-13 VITALS
BODY MASS INDEX: 34.53 KG/M2 | HEIGHT: 67 IN | SYSTOLIC BLOOD PRESSURE: 143 MMHG | HEART RATE: 47 BPM | WEIGHT: 220 LBS | TEMPERATURE: 97.8 F | DIASTOLIC BLOOD PRESSURE: 76 MMHG

## 2023-03-13 DIAGNOSIS — K80.20 CALCULUS OF GALLBLADDER W/OUT CHOLECYSTITIS W/OUT OBSTRUCTION: ICD-10-CM

## 2023-03-13 PROCEDURE — 99203 OFFICE O/P NEW LOW 30 MIN: CPT

## 2023-03-13 NOTE — PHYSICAL EXAM
[Respiratory Effort] : normal respiratory effort [Alert] : alert [Oriented to Person] : oriented to person [Oriented to Place] : oriented to place [Oriented to Time] : oriented to time [Calm] : calm [JVD] : no jugular venous distention  [Abdominal Masses] : No abdominal masses [Abdomen Tenderness] : ~T ~M No abdominal tenderness [de-identified] : NITIN EMZA NAD [de-identified] : EOMI [de-identified] : soft, NT ND obese.

## 2023-03-13 NOTE — HISTORY OF PRESENT ILLNESS
[de-identified] : 47 yo female with h/o HTN and hypothyroid was referred by GI for evaluation of symptomatic gallstones. patients states long h/o epigastric pain s/p meals which has gotten worse. She was seen in [facility] ED with pain and found to have stones. She states constant nausea with occasional vomiting. PAin radiates to her right upper back.

## 2023-06-26 ENCOUNTER — NON-APPOINTMENT (OUTPATIENT)
Age: 48
End: 2023-06-26

## 2023-06-26 ENCOUNTER — APPOINTMENT (OUTPATIENT)
Dept: ORTHOPEDIC SURGERY | Facility: CLINIC | Age: 48
End: 2023-06-26
Payer: COMMERCIAL

## 2023-06-26 DIAGNOSIS — M25.551 PAIN IN RIGHT HIP: ICD-10-CM

## 2023-06-26 DIAGNOSIS — G89.29 PAIN IN RIGHT HIP: ICD-10-CM

## 2023-06-26 PROCEDURE — 73502 X-RAY EXAM HIP UNI 2-3 VIEWS: CPT

## 2023-06-26 PROCEDURE — 99213 OFFICE O/P EST LOW 20 MIN: CPT

## 2023-06-26 RX ORDER — DICLOFENAC SODIUM 75 MG/1
75 TABLET, DELAYED RELEASE ORAL
Qty: 1 | Refills: 1 | Status: ACTIVE | COMMUNITY
Start: 2023-06-26 | End: 1900-01-01

## 2023-06-26 NOTE — HISTORY OF PRESENT ILLNESS
[Worsening] : worsening [___ yrs] : [unfilled] year(s) ago [5] : a current pain level of 5/10 [8] : a maximum pain level of 8/10 [Standing] : standing [Daily] : ~He/She~ states the symptoms seem to be occuring daily [Hip Movement] : worsened by hip movement [Sitting] : worsened by sitting [Walking] : worsened by walking [Recumbency] : relieved by recumbency [Rest] : relieved by rest [de-identified] : Patient being seen today in follow-up of right hip pain patient's last visit was 2019 she has been seen in the interim by Dr. Ding for low back spine problems she is on a near constant dose of cyclobenzaprine and diclofenac for low back and right hip pain.  Patient is complaining of intermittent spasms in the groin causing her hip to year give way while ambulating.  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  That MRI was done in 2020 [Acetaminophen] : not relieved by acetaminophen [Exercise Regimen] : not relieved by exercise regimen [Heat] : not relieved by heat [Ice] : not relieved by ice [NSAIDs] : not relieved by nonsteroidal anti-inflammatory drugs

## 2023-06-26 NOTE — DISCUSSION/SUMMARY
[de-identified] : Patient was advised that the hallmarks of their of full-thickness articular wear and that the patient would be benefited by a right total hip replacement if Dr. León concurs.  It was advised that the patient tentatively book a surgical date and have a follow-up evaluation by Dr. León for his opinion to concur that indeed she would benefit from a hip replacement since this is been going on for several years getting worse and not responding to conservative therapy.\par The patient was offered an intra-articular steroid injection done by an interventional radiologist but once she was made aware that she would not be able to have surgery for 3 months after the procedure she declined and would rather see Dr. León and book the surgery if he believed it would be helpful

## 2023-06-26 NOTE — PHYSICAL EXAM
[Antalgic] : antalgic [LE] : Sensory: Intact in bilateral lower extremities [DP] : dorsalis pedis 2+ and symmetric bilaterally [PT] : posterior tibial 2+ and symmetric bilaterally [Normal RLE] : Right Lower Extremity: No scars, rashes, lesions, ulcers, skin intact [Normal LLE] : Left Lower Extremity: No scars, rashes, lesions, ulcers, skin intact [Normal Touch] : sensation intact for touch [Normal] : no peripheral adenopathy appreciated [de-identified] : X-rays were done in the office today and reviewed prior to the examination [de-identified] : Heavyset 48-year-old woman ambulating with a halting antalgic gait favoring the right hip.  Patient has good sensation and pulses in the lower extremity good strength against resistance in EHL and dorsiflexion in the supine position the patient can flex greater than 90 degrees has about 30 degrees external rotation about 20 degrees internal rotation of 30 degree abduction 10 degree adduction and Gerald exam negative to about 60 degrees external rotation patient does have pain in the groin when recovering from external rotation [de-identified] : AP pelvis and lateral of the right hip shows a natural pelvis with what appears to be a reasonable thickness of articular cartilage in the superior area of the right hip joint.  This is an significant difference to the MRI of 2020 who's report was reviewed during this examination

## 2023-06-27 ENCOUNTER — APPOINTMENT (OUTPATIENT)
Dept: ORTHOPEDIC SURGERY | Facility: CLINIC | Age: 48
End: 2023-06-27
Payer: COMMERCIAL

## 2023-06-27 VITALS — SYSTOLIC BLOOD PRESSURE: 141 MMHG | DIASTOLIC BLOOD PRESSURE: 91 MMHG | HEART RATE: 64 BPM

## 2023-06-27 DIAGNOSIS — Q65.89 OTHER SPECIFIED CONGENITAL DEFORMITIES OF HIP: ICD-10-CM

## 2023-06-27 PROCEDURE — 99213 OFFICE O/P EST LOW 20 MIN: CPT

## 2023-11-29 ENCOUNTER — APPOINTMENT (OUTPATIENT)
Dept: SURGERY | Facility: CLINIC | Age: 48
End: 2023-11-29
Payer: COMMERCIAL

## 2023-11-29 VITALS
HEART RATE: 64 BPM | TEMPERATURE: 98.5 F | DIASTOLIC BLOOD PRESSURE: 90 MMHG | SYSTOLIC BLOOD PRESSURE: 152 MMHG | WEIGHT: 240 LBS | BODY MASS INDEX: 37.67 KG/M2 | HEIGHT: 67 IN | OXYGEN SATURATION: 98 %

## 2023-11-29 PROCEDURE — 99214 OFFICE O/P EST MOD 30 MIN: CPT

## 2023-12-06 ENCOUNTER — TRANSCRIPTION ENCOUNTER (OUTPATIENT)
Age: 48
End: 2023-12-06

## 2023-12-07 ENCOUNTER — TRANSCRIPTION ENCOUNTER (OUTPATIENT)
Age: 48
End: 2023-12-07

## 2023-12-07 ENCOUNTER — RESULT REVIEW (OUTPATIENT)
Age: 48
End: 2023-12-07

## 2023-12-07 ENCOUNTER — INPATIENT (INPATIENT)
Facility: HOSPITAL | Age: 48
LOS: 0 days | Discharge: HOME | End: 2023-12-08
Attending: SURGERY | Admitting: SURGERY
Payer: COMMERCIAL

## 2023-12-07 VITALS
SYSTOLIC BLOOD PRESSURE: 141 MMHG | RESPIRATION RATE: 18 BRPM | OXYGEN SATURATION: 96 % | TEMPERATURE: 98 F | HEART RATE: 61 BPM | HEIGHT: 67 IN | WEIGHT: 240.08 LBS | DIASTOLIC BLOOD PRESSURE: 89 MMHG

## 2023-12-07 DIAGNOSIS — K81.0 ACUTE CHOLECYSTITIS: ICD-10-CM

## 2023-12-07 DIAGNOSIS — Z98.891 HISTORY OF UTERINE SCAR FROM PREVIOUS SURGERY: Chronic | ICD-10-CM

## 2023-12-07 LAB
ALBUMIN SERPL ELPH-MCNC: 3.4 G/DL — SIGNIFICANT CHANGE UP (ref 3.3–5)
ALBUMIN SERPL ELPH-MCNC: 3.4 G/DL — SIGNIFICANT CHANGE UP (ref 3.3–5)
ALP SERPL-CCNC: 46 U/L — SIGNIFICANT CHANGE UP (ref 40–120)
ALP SERPL-CCNC: 46 U/L — SIGNIFICANT CHANGE UP (ref 40–120)
ALT FLD-CCNC: 15 U/L — SIGNIFICANT CHANGE UP (ref 12–78)
ALT FLD-CCNC: 15 U/L — SIGNIFICANT CHANGE UP (ref 12–78)
ANION GAP SERPL CALC-SCNC: 4 MMOL/L — LOW (ref 5–17)
ANION GAP SERPL CALC-SCNC: 4 MMOL/L — LOW (ref 5–17)
APTT BLD: 33.2 SEC — SIGNIFICANT CHANGE UP (ref 24.5–35.6)
APTT BLD: 33.2 SEC — SIGNIFICANT CHANGE UP (ref 24.5–35.6)
AST SERPL-CCNC: 16 U/L — SIGNIFICANT CHANGE UP (ref 15–37)
AST SERPL-CCNC: 16 U/L — SIGNIFICANT CHANGE UP (ref 15–37)
BASOPHILS # BLD AUTO: 0.03 K/UL — SIGNIFICANT CHANGE UP (ref 0–0.2)
BASOPHILS # BLD AUTO: 0.03 K/UL — SIGNIFICANT CHANGE UP (ref 0–0.2)
BASOPHILS NFR BLD AUTO: 0.6 % — SIGNIFICANT CHANGE UP (ref 0–2)
BASOPHILS NFR BLD AUTO: 0.6 % — SIGNIFICANT CHANGE UP (ref 0–2)
BILIRUB SERPL-MCNC: 0.4 MG/DL — SIGNIFICANT CHANGE UP (ref 0.2–1.2)
BILIRUB SERPL-MCNC: 0.4 MG/DL — SIGNIFICANT CHANGE UP (ref 0.2–1.2)
BLD GP AB SCN SERPL QL: SIGNIFICANT CHANGE UP
BLD GP AB SCN SERPL QL: SIGNIFICANT CHANGE UP
BUN SERPL-MCNC: 11 MG/DL — SIGNIFICANT CHANGE UP (ref 7–23)
BUN SERPL-MCNC: 11 MG/DL — SIGNIFICANT CHANGE UP (ref 7–23)
CALCIUM SERPL-MCNC: 9.4 MG/DL — SIGNIFICANT CHANGE UP (ref 8.5–10.1)
CALCIUM SERPL-MCNC: 9.4 MG/DL — SIGNIFICANT CHANGE UP (ref 8.5–10.1)
CHLORIDE SERPL-SCNC: 108 MMOL/L — SIGNIFICANT CHANGE UP (ref 96–108)
CHLORIDE SERPL-SCNC: 108 MMOL/L — SIGNIFICANT CHANGE UP (ref 96–108)
CO2 SERPL-SCNC: 29 MMOL/L — SIGNIFICANT CHANGE UP (ref 22–31)
CO2 SERPL-SCNC: 29 MMOL/L — SIGNIFICANT CHANGE UP (ref 22–31)
CREAT SERPL-MCNC: 0.66 MG/DL — SIGNIFICANT CHANGE UP (ref 0.5–1.3)
CREAT SERPL-MCNC: 0.66 MG/DL — SIGNIFICANT CHANGE UP (ref 0.5–1.3)
EGFR: 108 ML/MIN/1.73M2 — SIGNIFICANT CHANGE UP
EGFR: 108 ML/MIN/1.73M2 — SIGNIFICANT CHANGE UP
EOSINOPHIL # BLD AUTO: 0.13 K/UL — SIGNIFICANT CHANGE UP (ref 0–0.5)
EOSINOPHIL # BLD AUTO: 0.13 K/UL — SIGNIFICANT CHANGE UP (ref 0–0.5)
EOSINOPHIL NFR BLD AUTO: 2.5 % — SIGNIFICANT CHANGE UP (ref 0–6)
EOSINOPHIL NFR BLD AUTO: 2.5 % — SIGNIFICANT CHANGE UP (ref 0–6)
GLUCOSE SERPL-MCNC: 87 MG/DL — SIGNIFICANT CHANGE UP (ref 70–99)
GLUCOSE SERPL-MCNC: 87 MG/DL — SIGNIFICANT CHANGE UP (ref 70–99)
HCG SERPL-ACNC: 2 MIU/ML — SIGNIFICANT CHANGE UP
HCG SERPL-ACNC: 2 MIU/ML — SIGNIFICANT CHANGE UP
HCT VFR BLD CALC: 35.7 % — SIGNIFICANT CHANGE UP (ref 34.5–45)
HCT VFR BLD CALC: 35.7 % — SIGNIFICANT CHANGE UP (ref 34.5–45)
HGB BLD-MCNC: 11.7 G/DL — SIGNIFICANT CHANGE UP (ref 11.5–15.5)
HGB BLD-MCNC: 11.7 G/DL — SIGNIFICANT CHANGE UP (ref 11.5–15.5)
IMM GRANULOCYTES NFR BLD AUTO: 0.2 % — SIGNIFICANT CHANGE UP (ref 0–0.9)
IMM GRANULOCYTES NFR BLD AUTO: 0.2 % — SIGNIFICANT CHANGE UP (ref 0–0.9)
INR BLD: 0.91 RATIO — SIGNIFICANT CHANGE UP (ref 0.85–1.18)
INR BLD: 0.91 RATIO — SIGNIFICANT CHANGE UP (ref 0.85–1.18)
LIDOCAIN IGE QN: 22 U/L — SIGNIFICANT CHANGE UP (ref 13–75)
LIDOCAIN IGE QN: 22 U/L — SIGNIFICANT CHANGE UP (ref 13–75)
LYMPHOCYTES # BLD AUTO: 2.37 K/UL — SIGNIFICANT CHANGE UP (ref 1–3.3)
LYMPHOCYTES # BLD AUTO: 2.37 K/UL — SIGNIFICANT CHANGE UP (ref 1–3.3)
LYMPHOCYTES # BLD AUTO: 46.3 % — HIGH (ref 13–44)
LYMPHOCYTES # BLD AUTO: 46.3 % — HIGH (ref 13–44)
MCHC RBC-ENTMCNC: 29.8 PG — SIGNIFICANT CHANGE UP (ref 27–34)
MCHC RBC-ENTMCNC: 29.8 PG — SIGNIFICANT CHANGE UP (ref 27–34)
MCHC RBC-ENTMCNC: 32.8 G/DL — SIGNIFICANT CHANGE UP (ref 32–36)
MCHC RBC-ENTMCNC: 32.8 G/DL — SIGNIFICANT CHANGE UP (ref 32–36)
MCV RBC AUTO: 90.8 FL — SIGNIFICANT CHANGE UP (ref 80–100)
MCV RBC AUTO: 90.8 FL — SIGNIFICANT CHANGE UP (ref 80–100)
MONOCYTES # BLD AUTO: 0.45 K/UL — SIGNIFICANT CHANGE UP (ref 0–0.9)
MONOCYTES # BLD AUTO: 0.45 K/UL — SIGNIFICANT CHANGE UP (ref 0–0.9)
MONOCYTES NFR BLD AUTO: 8.8 % — SIGNIFICANT CHANGE UP (ref 2–14)
MONOCYTES NFR BLD AUTO: 8.8 % — SIGNIFICANT CHANGE UP (ref 2–14)
NEUTROPHILS # BLD AUTO: 2.13 K/UL — SIGNIFICANT CHANGE UP (ref 1.8–7.4)
NEUTROPHILS # BLD AUTO: 2.13 K/UL — SIGNIFICANT CHANGE UP (ref 1.8–7.4)
NEUTROPHILS NFR BLD AUTO: 41.6 % — LOW (ref 43–77)
NEUTROPHILS NFR BLD AUTO: 41.6 % — LOW (ref 43–77)
NRBC # BLD: 0 /100 WBCS — SIGNIFICANT CHANGE UP (ref 0–0)
NRBC # BLD: 0 /100 WBCS — SIGNIFICANT CHANGE UP (ref 0–0)
PLATELET # BLD AUTO: 229 K/UL — SIGNIFICANT CHANGE UP (ref 150–400)
PLATELET # BLD AUTO: 229 K/UL — SIGNIFICANT CHANGE UP (ref 150–400)
POTASSIUM SERPL-MCNC: 3.6 MMOL/L — SIGNIFICANT CHANGE UP (ref 3.5–5.3)
POTASSIUM SERPL-MCNC: 3.6 MMOL/L — SIGNIFICANT CHANGE UP (ref 3.5–5.3)
POTASSIUM SERPL-SCNC: 3.6 MMOL/L — SIGNIFICANT CHANGE UP (ref 3.5–5.3)
POTASSIUM SERPL-SCNC: 3.6 MMOL/L — SIGNIFICANT CHANGE UP (ref 3.5–5.3)
PROT SERPL-MCNC: 7.7 GM/DL — SIGNIFICANT CHANGE UP (ref 6–8.3)
PROT SERPL-MCNC: 7.7 GM/DL — SIGNIFICANT CHANGE UP (ref 6–8.3)
PROTHROM AB SERPL-ACNC: 11 SEC — SIGNIFICANT CHANGE UP (ref 9.5–13)
PROTHROM AB SERPL-ACNC: 11 SEC — SIGNIFICANT CHANGE UP (ref 9.5–13)
RBC # BLD: 3.93 M/UL — SIGNIFICANT CHANGE UP (ref 3.8–5.2)
RBC # BLD: 3.93 M/UL — SIGNIFICANT CHANGE UP (ref 3.8–5.2)
RBC # FLD: 12.7 % — SIGNIFICANT CHANGE UP (ref 10.3–14.5)
RBC # FLD: 12.7 % — SIGNIFICANT CHANGE UP (ref 10.3–14.5)
SODIUM SERPL-SCNC: 141 MMOL/L — SIGNIFICANT CHANGE UP (ref 135–145)
SODIUM SERPL-SCNC: 141 MMOL/L — SIGNIFICANT CHANGE UP (ref 135–145)
WBC # BLD: 5.12 K/UL — SIGNIFICANT CHANGE UP (ref 3.8–10.5)
WBC # BLD: 5.12 K/UL — SIGNIFICANT CHANGE UP (ref 3.8–10.5)
WBC # FLD AUTO: 5.12 K/UL — SIGNIFICANT CHANGE UP (ref 3.8–10.5)
WBC # FLD AUTO: 5.12 K/UL — SIGNIFICANT CHANGE UP (ref 3.8–10.5)

## 2023-12-07 PROCEDURE — 47562 LAPAROSCOPIC CHOLECYSTECTOMY: CPT | Mod: AS

## 2023-12-07 PROCEDURE — 99222 1ST HOSP IP/OBS MODERATE 55: CPT | Mod: GC,57

## 2023-12-07 PROCEDURE — 93010 ELECTROCARDIOGRAM REPORT: CPT

## 2023-12-07 PROCEDURE — 99285 EMERGENCY DEPT VISIT HI MDM: CPT

## 2023-12-07 PROCEDURE — 99232 SBSQ HOSP IP/OBS MODERATE 35: CPT

## 2023-12-07 PROCEDURE — 88304 TISSUE EXAM BY PATHOLOGIST: CPT | Mod: 26

## 2023-12-07 PROCEDURE — 47562 LAPAROSCOPIC CHOLECYSTECTOMY: CPT

## 2023-12-07 DEVICE — CLIP APPLIER COVIDIEN ENDOCLIP III 5MM: Type: IMPLANTABLE DEVICE | Status: FUNCTIONAL

## 2023-12-07 RX ORDER — PIPERACILLIN AND TAZOBACTAM 4; .5 G/20ML; G/20ML
3.38 INJECTION, POWDER, LYOPHILIZED, FOR SOLUTION INTRAVENOUS ONCE
Refills: 0 | Status: COMPLETED | OUTPATIENT
Start: 2023-12-07 | End: 2023-12-07

## 2023-12-07 RX ORDER — SODIUM CHLORIDE 9 MG/ML
1000 INJECTION, SOLUTION INTRAVENOUS
Refills: 0 | Status: DISCONTINUED | OUTPATIENT
Start: 2023-12-07 | End: 2023-12-08

## 2023-12-07 RX ORDER — METOPROLOL TARTRATE 50 MG
50 TABLET ORAL DAILY
Refills: 0 | Status: DISCONTINUED | OUTPATIENT
Start: 2023-12-07 | End: 2023-12-07

## 2023-12-07 RX ORDER — ONDANSETRON 8 MG/1
4 TABLET, FILM COATED ORAL ONCE
Refills: 0 | Status: COMPLETED | OUTPATIENT
Start: 2023-12-07 | End: 2023-12-07

## 2023-12-07 RX ORDER — METOPROLOL TARTRATE 50 MG
1 TABLET ORAL
Refills: 0 | DISCHARGE

## 2023-12-07 RX ORDER — LEVOTHYROXINE SODIUM 125 MCG
75 TABLET ORAL DAILY
Refills: 0 | Status: DISCONTINUED | OUTPATIENT
Start: 2023-12-07 | End: 2023-12-07

## 2023-12-07 RX ORDER — SODIUM CHLORIDE 9 MG/ML
1000 INJECTION INTRAMUSCULAR; INTRAVENOUS; SUBCUTANEOUS ONCE
Refills: 0 | Status: COMPLETED | OUTPATIENT
Start: 2023-12-07 | End: 2023-12-07

## 2023-12-07 RX ORDER — ONDANSETRON 8 MG/1
4 TABLET, FILM COATED ORAL EVERY 6 HOURS
Refills: 0 | Status: DISCONTINUED | OUTPATIENT
Start: 2023-12-07 | End: 2023-12-07

## 2023-12-07 RX ORDER — HYDROCHLOROTHIAZIDE 25 MG
1 TABLET ORAL
Refills: 0 | DISCHARGE

## 2023-12-07 RX ORDER — MORPHINE SULFATE 50 MG/1
2 CAPSULE, EXTENDED RELEASE ORAL EVERY 6 HOURS
Refills: 0 | Status: DISCONTINUED | OUTPATIENT
Start: 2023-12-07 | End: 2023-12-07

## 2023-12-07 RX ORDER — HEPARIN SODIUM 5000 [USP'U]/ML
5000 INJECTION INTRAVENOUS; SUBCUTANEOUS EVERY 8 HOURS
Refills: 0 | Status: DISCONTINUED | OUTPATIENT
Start: 2023-12-07 | End: 2023-12-08

## 2023-12-07 RX ORDER — MORPHINE SULFATE 50 MG/1
2 CAPSULE, EXTENDED RELEASE ORAL ONCE
Refills: 0 | Status: DISCONTINUED | OUTPATIENT
Start: 2023-12-07 | End: 2023-12-07

## 2023-12-07 RX ORDER — ACETAMINOPHEN 500 MG
650 TABLET ORAL EVERY 6 HOURS
Refills: 0 | Status: DISCONTINUED | OUTPATIENT
Start: 2023-12-07 | End: 2023-12-07

## 2023-12-07 RX ORDER — OXYCODONE HYDROCHLORIDE 5 MG/1
10 TABLET ORAL EVERY 6 HOURS
Refills: 0 | Status: DISCONTINUED | OUTPATIENT
Start: 2023-12-07 | End: 2023-12-08

## 2023-12-07 RX ORDER — METOPROLOL TARTRATE 50 MG
50 TABLET ORAL DAILY
Refills: 0 | Status: DISCONTINUED | OUTPATIENT
Start: 2023-12-07 | End: 2023-12-08

## 2023-12-07 RX ORDER — HYDROMORPHONE HYDROCHLORIDE 2 MG/ML
1 INJECTION INTRAMUSCULAR; INTRAVENOUS; SUBCUTANEOUS
Refills: 0 | Status: DISCONTINUED | OUTPATIENT
Start: 2023-12-07 | End: 2023-12-07

## 2023-12-07 RX ORDER — SODIUM CHLORIDE 9 MG/ML
1000 INJECTION, SOLUTION INTRAVENOUS
Refills: 0 | Status: DISCONTINUED | OUTPATIENT
Start: 2023-12-07 | End: 2023-12-07

## 2023-12-07 RX ORDER — KETOROLAC TROMETHAMINE 30 MG/ML
15 SYRINGE (ML) INJECTION EVERY 6 HOURS
Refills: 0 | Status: DISCONTINUED | OUTPATIENT
Start: 2023-12-07 | End: 2023-12-08

## 2023-12-07 RX ORDER — HYDROMORPHONE HYDROCHLORIDE 2 MG/ML
0.5 INJECTION INTRAMUSCULAR; INTRAVENOUS; SUBCUTANEOUS
Refills: 0 | Status: DISCONTINUED | OUTPATIENT
Start: 2023-12-07 | End: 2023-12-07

## 2023-12-07 RX ORDER — LEVOTHYROXINE SODIUM 125 MCG
1 TABLET ORAL
Refills: 0 | DISCHARGE

## 2023-12-07 RX ORDER — PIPERACILLIN AND TAZOBACTAM 4; .5 G/20ML; G/20ML
3.38 INJECTION, POWDER, LYOPHILIZED, FOR SOLUTION INTRAVENOUS EVERY 8 HOURS
Refills: 0 | Status: DISCONTINUED | OUTPATIENT
Start: 2023-12-07 | End: 2023-12-07

## 2023-12-07 RX ORDER — KETOROLAC TROMETHAMINE 30 MG/ML
15 SYRINGE (ML) INJECTION ONCE
Refills: 0 | Status: DISCONTINUED | OUTPATIENT
Start: 2023-12-07 | End: 2023-12-07

## 2023-12-07 RX ORDER — HEPARIN SODIUM 5000 [USP'U]/ML
5000 INJECTION INTRAVENOUS; SUBCUTANEOUS EVERY 8 HOURS
Refills: 0 | Status: DISCONTINUED | OUTPATIENT
Start: 2023-12-07 | End: 2023-12-07

## 2023-12-07 RX ORDER — OXYCODONE HYDROCHLORIDE 5 MG/1
5 TABLET ORAL EVERY 4 HOURS
Refills: 0 | Status: DISCONTINUED | OUTPATIENT
Start: 2023-12-07 | End: 2023-12-08

## 2023-12-07 RX ORDER — ACETAMINOPHEN 500 MG
975 TABLET ORAL EVERY 6 HOURS
Refills: 0 | Status: DISCONTINUED | OUTPATIENT
Start: 2023-12-07 | End: 2023-12-08

## 2023-12-07 RX ORDER — LEVOTHYROXINE SODIUM 125 MCG
75 TABLET ORAL DAILY
Refills: 0 | Status: DISCONTINUED | OUTPATIENT
Start: 2023-12-07 | End: 2023-12-08

## 2023-12-07 RX ADMIN — MORPHINE SULFATE 2 MILLIGRAM(S): 50 CAPSULE, EXTENDED RELEASE ORAL at 07:24

## 2023-12-07 RX ADMIN — PIPERACILLIN AND TAZOBACTAM 200 GRAM(S): 4; .5 INJECTION, POWDER, LYOPHILIZED, FOR SOLUTION INTRAVENOUS at 06:52

## 2023-12-07 RX ADMIN — SODIUM CHLORIDE 1000 MILLILITER(S): 9 INJECTION INTRAMUSCULAR; INTRAVENOUS; SUBCUTANEOUS at 06:54

## 2023-12-07 RX ADMIN — HYDROMORPHONE HYDROCHLORIDE 1 MILLIGRAM(S): 2 INJECTION INTRAMUSCULAR; INTRAVENOUS; SUBCUTANEOUS at 12:04

## 2023-12-07 RX ADMIN — HYDROMORPHONE HYDROCHLORIDE 1 MILLIGRAM(S): 2 INJECTION INTRAMUSCULAR; INTRAVENOUS; SUBCUTANEOUS at 12:16

## 2023-12-07 RX ADMIN — SODIUM CHLORIDE 150 MILLILITER(S): 9 INJECTION, SOLUTION INTRAVENOUS at 21:28

## 2023-12-07 RX ADMIN — Medication 975 MILLIGRAM(S): at 18:57

## 2023-12-07 RX ADMIN — MORPHINE SULFATE 2 MILLIGRAM(S): 50 CAPSULE, EXTENDED RELEASE ORAL at 08:15

## 2023-12-07 RX ADMIN — ONDANSETRON 4 MILLIGRAM(S): 8 TABLET, FILM COATED ORAL at 13:41

## 2023-12-07 RX ADMIN — ONDANSETRON 4 MILLIGRAM(S): 8 TABLET, FILM COATED ORAL at 06:53

## 2023-12-07 RX ADMIN — HYDROMORPHONE HYDROCHLORIDE 1 MILLIGRAM(S): 2 INJECTION INTRAMUSCULAR; INTRAVENOUS; SUBCUTANEOUS at 12:10

## 2023-12-07 RX ADMIN — HYDROMORPHONE HYDROCHLORIDE 1 MILLIGRAM(S): 2 INJECTION INTRAMUSCULAR; INTRAVENOUS; SUBCUTANEOUS at 12:20

## 2023-12-07 RX ADMIN — HEPARIN SODIUM 5000 UNIT(S): 5000 INJECTION INTRAVENOUS; SUBCUTANEOUS at 21:28

## 2023-12-07 RX ADMIN — SODIUM CHLORIDE 125 MILLILITER(S): 9 INJECTION, SOLUTION INTRAVENOUS at 12:17

## 2023-12-07 RX ADMIN — OXYCODONE HYDROCHLORIDE 5 MILLIGRAM(S): 5 TABLET ORAL at 16:56

## 2023-12-07 RX ADMIN — Medication 15 MILLIGRAM(S): at 18:56

## 2023-12-07 NOTE — H&P ADULT - NSHPPHYSICALEXAM_GEN_ALL_CORE
PHYSICAL EXAM:  CONSTITUTIONAL: NAD, well-developed  HEAD:  Atraumatic, Normocephalic  EYES: Conjunctiva and sclera clear  ENMT: No tonsillar erythema, exudates, or enlargement; Moist mucous membranes, No lesions  NECK: Supple, No JVD, Normal thyroid  NERVOUS SYSTEM:  Alert & Oriented X3  RESPIRATORY: Clear to auscultation bilaterally; No rales, rhonchi, wheezing  CARDIOVASCULAR: Regular rate and rhythm. S1S2  GASTROINTESTINAL: Nondistended, +BS, soft, RUQ/epigastric tenderness, no guarding, no rigidity   MUSCULOSKELETAL:  2+ Peripheral Pulses, No clubbing, cyanosis, or edema

## 2023-12-07 NOTE — ED ADULT NURSE NOTE - ED STAT RN HANDOFF DETAILS
Report given to receiving RN poornima ,pts history, current condition and reason for admission discussed, safety concerns addressed and reviewed, pt currently in stable condition, IV flushes for patency and site shows no signs or symptoms of infiltrate, dressing is clean dry and intact, pt is aware of plan of care. Pt education deemed successful at time of report after patient demonstrates successful teach back for proficiency.

## 2023-12-07 NOTE — H&P ADULT - HISTORY OF PRESENT ILLNESS
47 y/o female PMHx HTN, hypothyroidism,   c/o RUQ pain intermittently for one year, sent in by Dr. Crawford for laparoscopic cholecystectomy today. Pt had an outpatient US. Pain occurs almost everyday now. RUQ pain is severe and radiates to the epigastric region and back. Pain usually occurs after she eats or wakes her up from her sleep. Has been having nausea. Patient denies fever, chills, constipation, diarrhea, melena, hematochezia, dysuria, hematuria, chest pain, shortness of breath, dizziness, cough..  49 y/o female PMHx HTN, hypothyroidism,   c/o RUQ pain intermittently for one year, sent in by Dr. Crawford for laparoscopic cholecystectomy today. Pt had an outpatient US. Pain occurs almost everyday now. RUQ pain is severe and radiates to the epigastric region and back. Pain usually occurs after she eats or wakes her up from her sleep. Has been having nausea. Patient denies fever, chills, constipation, diarrhea, melena, hematochezia, dysuria, hematuria, chest pain, shortness of breath, dizziness, cough..

## 2023-12-07 NOTE — ED ADULT NURSE NOTE - NSFALLUNIVINTERV_ED_ALL_ED
Bed/Stretcher in lowest position, wheels locked, appropriate side rails in place/Call bell, personal items and telephone in reach/Instruct patient to call for assistance before getting out of bed/chair/stretcher/Non-slip footwear applied when patient is off stretcher/[place] to call system/Physically safe environment - no spills, clutter or unnecessary equipment/Purposeful proactive rounding/Room/bathroom lighting operational, light cord in reach Bed/Stretcher in lowest position, wheels locked, appropriate side rails in place/Call bell, personal items and telephone in reach/Instruct patient to call for assistance before getting out of bed/chair/stretcher/Non-slip footwear applied when patient is off stretcher/[place] to call system/Physically safe environment - no spills, clutter or unnecessary equipment/Purposeful proactive rounding/Room/bathroom lighting operational, light cord in reach

## 2023-12-07 NOTE — DISCHARGE NOTE PROVIDER - CARE PROVIDERS DIRECT ADDRESSES
[email or web address]

## 2023-12-07 NOTE — ED PROVIDER NOTE - PHYSICAL EXAMINATION
General: No acute distress, mentation at baseline,  well nourished, well developed  HEENT: NCAT, Neck supple without meningismus, PERRL, no conjunctival injection  Lungs: CTAB, No wheeze or crackles, No retractions, No increased work of breathing  Heart: S1S2 RRR, No M/R/G, Pules equal Bilaterally in upper and lower extremities distally  Abd: soft, mi;ld RUQ TTP, ND, No guarding, No rebound.  No hernias, no palpable masses.  Extrem: FROM in all joints, no gross deformities appreciated, no significant edema noted, No ulcers. Cap refil < 2sec.  Skin: No rash noted, warm dry.  Neuro:  Grossly normal.  No difficulty ambulating. No focal deficits.  Psychiatric: Appropriate mood and affect.

## 2023-12-07 NOTE — H&P ADULT - NSICDXFAMILYHX_GEN_ALL_CORE_FT
FAMILY HISTORY:  FH: hypertension    Grandparent  Still living? Unknown  FH: breast cancer, Age at diagnosis: Age Unknown

## 2023-12-07 NOTE — DISCHARGE NOTE PROVIDER - HOSPITAL COURSE
47 yo female with known gallstones scheduled for elective cholecystectomy in a few weeks presented to ED this morning with increasing pain. She was admitted and added to OR schedule for laparoscopic cholecystectomy. She tolerated the procedure well and is recovering in PACU. She can be discharged home from PACU after 2 hr stay 49 yo female with known gallstones scheduled for elective cholecystectomy in a few weeks presented to ED this morning with increasing pain. She was admitted and added to OR schedule for laparoscopic cholecystectomy. She tolerated the procedure well and is recovering in PACU. She can be discharged home from PACU after 2 hr stay 47 yo female with known gallstones scheduled for elective cholecystectomy in a few weeks presented to ED this morning with increasing pain. She was admitted and added to OR schedule for laparoscopic cholecystectomy. Pt was stable for dc and was discharged on POD#1. 49 yo female with known gallstones scheduled for elective cholecystectomy in a few weeks presented to ED this morning with increasing pain. She was admitted and added to OR schedule for laparoscopic cholecystectomy. Pt was stable for dc and was discharged on POD#1.

## 2023-12-07 NOTE — H&P ADULT - NSHPPOADEEPVENOUSTHROMB_GEN_A_CORE
" LOS: 3 days   Patient Care Team:  Syed Chávez DO as PCP - General      Subjective \"I am feeling good\"    Interval History:     Subjective: Denies nausea or vomiting.  Minimal abdominal soreness.  No bowel movements overnight.  No fevers.      ROS:   No chest pain, shortness of breath, or cough.        Medication Review:     Current Facility-Administered Medications:   •  busPIRone (BUSPAR) tablet 15 mg, 15 mg, Oral, Q8H, Syed Martini MD, 15 mg at 09/10/21 0621  •  ferric gluconate (FERRLECIT)125 MG in sodium chloride 0.9 % 100 mL IVPB, 125 mg, Intravenous, Once, Syed Martini MD  •  FLUoxetine (PROzac) capsule 40 mg, 40 mg, Oral, Daily, Syed Martini MD, 40 mg at 09/10/21 0937  •  HYDROcodone-acetaminophen (NORCO) 5-325 MG per tablet 1 tablet, 1 tablet, Oral, Q4H PRN, 1 tablet at 09/10/21 0424 **OR** HYDROcodone-acetaminophen (NORCO) 5-325 MG per tablet 2 tablet, 2 tablet, Oral, Q4H PRN, Syed Martini MD, 2 tablet at 09/10/21 0937  •  HYDROmorphone (DILAUDID) injection 0.5 mg, 0.5 mg, Intravenous, Q2H PRN **AND** naloxone (NARCAN) injection 0.1 mg, 0.1 mg, Intravenous, Q5 Min PRN, Syed Martini MD  •  lactated ringers infusion, 100 mL/hr, Intravenous, Continuous, Syed Martini MD, Stopped at 09/08/21 1340  •  lactated ringers infusion, 125 mL/hr, Intravenous, Continuous, Syed Martini MD, Last Rate: 125 mL/hr at 09/08/21 2327, 125 mL/hr at 09/08/21 2327  •  lactated ringers infusion, 100 mL/hr, Intravenous, Continuous, Syed Martini MD, Last Rate: 100 mL/hr at 09/10/21 0424, 100 mL/hr at 09/10/21 0424  •  metoclopramide (REGLAN) tablet 10 mg, 10 mg, Oral, TID AC, Syed Martini MD, 10 mg at 09/10/21 0638  •  Morphine sulfate (PF) injection 2 mg, 2 mg, Intravenous, Q4H PRN, Syed Martini MD, 2 mg at 09/07/21 6589  •  ondansetron (ZOFRAN) tablet 4 mg, 4 mg, Oral, Q6H PRN **OR** ondansetron (ZOFRAN) " injection 4 mg, 4 mg, Intravenous, Q6H PRN, Syed Martini MD  •  pantoprazole (PROTONIX) EC tablet 40 mg, 40 mg, Oral, Q AM, Syed Martini MD, 40 mg at 09/10/21 0621  •  phenylephrine (FRANKIE-SYNEPHRINE) 50 mg in sodium chloride 0.9 % 250 mL infusion, 0.5-3 mcg/kg/min, Intravenous, Continuous PRN, Syed Martini MD  •  sodium chloride 0.9 % flush 10 mL, 10 mL, Intravenous, Q12H, Syed Martini MD, 10 mL at 09/10/21 0938  •  sodium chloride 0.9 % flush 10 mL, 10 mL, Intravenous, PRN, Syed Martini MD      Objective Resting in bed, no acute distress, no family present    Vital Signs  Vitals:    09/09/21 1618 09/09/21 2023 09/10/21 0430 09/10/21 0835   BP: 110/72 127/70 98/58 107/66   BP Location: Left arm Left arm Left arm Left arm   Patient Position: Lying Lying Lying Lying   Pulse: 89 95 76 62   Resp: 16 18 18 18   Temp: 97.8 °F (36.6 °C) 98.2 °F (36.8 °C) 97.6 °F (36.4 °C) 98.3 °F (36.8 °C)   TempSrc: Oral Oral Oral Oral   SpO2: 98% 96% 97% 98%   Weight:       Height:           Physical Exam:     General Appearance:    Awake and alert, in no acute distress   Head:    Normocephalic, without obvious abnormality   Eyes:          Conjunctivae normal, anicteric sclera   Throat:   No oral lesions, no thrush, oral mucosa moist   Neck:    supple, no JVD   Lungs:     respirations regular, even and unlabored       Rectal:     Deferred   Extremities:   No edema, no cyanosis   Skin:   No bruising or rash, no jaundice        Results Review:    CBC    Results from last 7 days   Lab Units 09/10/21  0743 09/09/21  0123 09/08/21  0924 09/08/21  0405 09/07/21  0506 09/07/21  0038   WBC 10*3/mm3 7.60 9.00  --  10.80 7.90 9.30   HEMOGLOBIN g/dL 8.8* 9.8* 11.3* 12.5* 16.3 17.3   PLATELETS 10*3/mm3 148 151  --  214 187 188     CMP   Results from last 7 days   Lab Units 09/10/21  0743 09/09/21  0123 09/08/21  0405 09/07/21  0506 09/07/21  0038   SODIUM mmol/L 140 137 136 137 137    POTASSIUM mmol/L 3.7 4.0 4.5 4.3 3.8   CHLORIDE mmol/L 104 106 100 102 98   CO2 mmol/L 28.0 25.0 24.0 28.0 28.0   BUN mg/dL 8 24* 28* 12 13   CREATININE mg/dL 0.99 1.19 1.38* 1.20 1.28*   GLUCOSE mg/dL 123* 87 160* 111* 133*   ALBUMIN g/dL 3.20* 3.00* 3.10* 3.90 4.00   BILIRUBIN mg/dL 0.6 0.9 2.1* 3.7* 4.2*   ALK PHOS U/L 115 137* 178* 211* 236*   AST (SGOT) U/L 89* 80* 151* 270* 312*   ALT (SGPT) U/L 204* 223* 354* 395* 440*   LIPASE U/L  --   --  45  --  57     Cr Clearance Estimated Creatinine Clearance: 113.5 mL/min (by C-G formula based on SCr of 0.99 mg/dL).  Coag   Results from last 7 days   Lab Units 09/07/21  0128   INR  1.03   APTT seconds 25.1     HbA1C   Lab Results   Component Value Date    HGBA1C 6.4 (H) 11/18/2020    HGBA1C 6.3 (H) 10/14/2019    HGBA1C 6.2 (H) 04/08/2019     Blood Glucose   Glucose   Date/Time Value Ref Range Status   09/08/2021 0421 138 (H) 70 - 105 mg/dL Final     Comment:     Serial Number: [id number]:  [id number]     Infection     UA    Results from last 7 days   Lab Units 09/07/21  0045   NITRITE UA  Positive*   WBC UA /HPF None Seen   BACTERIA UA /HPF None Seen   SQUAM EPITHEL UA /HPF 0-2     Radiology(recent) FL ercp biliary duct only    Result Date: 9/8/2021  Intraoperative fluoroscopy during ERCP. Please refer to procedural report for details.  Electronically Signed By-Ann Marie Peralta MD On:9/8/2021 2:14 PM This report was finalized on [id number] by  Ann Marie Peralta MD.         Assessment/Plan   Choledocholithiasis s/p ERCP with stone extraction  Elevated LFTs -likely secondary to above  Acute nausea, vomiting and abdominal pain--resolved  Upper GI bleed s/p sphincterotomy  Cholelithiasis s/p cholecystectomy  GERD     PLAN:  9/7/2021 ERCP with choledocholithiasis with impacted stone s/p sphincterotomy and clearance of bile duct.  LFTs improving.  EGD  with concern for bleeding with no active bleeding seen but small red spot at area of sphincterotomy with likely  previous bleeding status post clip and sclerotherapy.  He denies any further rectal bleeding or bowel movement overnight.  Hemoglobin 8.8 noted.  Continue PPI and avoid any NSAIDs.  Cholecystectomy yesterday with continued improvement of LFTs.  Diet advancement per general surgery.  IV iron given yesterday.  Continue supportive care hopefully discharge home soon.    Electronically signed by SOFI Cifuentes, 09/10/21, 9:54 AM EDT.          no

## 2023-12-07 NOTE — H&P ADULT - ASSESSMENT
47 y/o female PMHx HTN, hypothyroidism,   c/o RUQ pain intermittently for one year, sent in by Dr. Crawford for laparoscopic cholecystectomy today. Pt had an outpatient US.

## 2023-12-07 NOTE — DISCHARGE NOTE PROVIDER - NSDCMRMEDTOKEN_GEN_ALL_CORE_FT
hydroCHLOROthiazide 12.5 mg oral capsule: 1 cap(s) orally once a day  levothyroxine 75 mcg (0.075 mg) oral tablet: 1 tab(s) orally once a day  metoprolol succinate 50 mg oral tablet, extended release: 1 tab(s) orally   hydroCHLOROthiazide 12.5 mg oral capsule: 1 cap(s) orally once a day  levothyroxine 75 mcg (0.075 mg) oral tablet: 1 tab(s) orally once a day  metoprolol succinate 50 mg oral tablet, extended release: 1 tab(s) orally  oxyCODONE 5 mg oral tablet: 1 tab(s) orally every 6 hours as needed for  severe pain MDD: 4

## 2023-12-07 NOTE — ED PROVIDER NOTE - CLINICAL SUMMARY MEDICAL DECISION MAKING FREE TEXT BOX
pt sent here by surgeon Dr. Crawford for direct admission for cholecystectomy. confirmed on US. surgery requesting pre op labs, IVF and zosyn pre-op. pt does not want analgesia at this time, hemodynamicalyl stable. will give zofran for nausea and admit to  surg

## 2023-12-07 NOTE — ED PROVIDER NOTE - OBJECTIVE STATEMENT
48-year-old female with past ministry of hypertension hypothyroidism presenting with cholecystectomy.  Patient had ultrasound yesterday that showed cholecystectomy, has had biliary colic in the past.  Endorses right upper quadrant pain and nausea.  Sent here by Dr. Crawford  surgeon direct admission for cholecystectomy.  Patient denies fever

## 2023-12-07 NOTE — ED ADULT NURSE REASSESSMENT NOTE - NS ED NURSE REASSESS COMMENT FT1
patient awaiting SX at this time. called OR to follow up- patient is an ADD-ON SX pushed back to evening. patient made aware.

## 2023-12-07 NOTE — PATIENT PROFILE ADULT - NSPROPTRIGHTCAREGIVER_GEN_A_NUR
-H/H is 8.8, which is stable and consistent with previous laboratory measurements. Patient exhibits no signs or symptoms of acute bleeding  -Etiology likely due to multifactorial..iron deficiency vs chronic disease?  -No indication for blood transfusion  -Continue to monitor serial CBC  -Transfuse for Hgb <7 or if symptomatic       no

## 2023-12-07 NOTE — ED ADULT NURSE NOTE - OBJECTIVE STATEMENT
A&ox4, c/o surgical intervention. As per pt, she was diagnosed with gallstones and is scheduled for cholecystectomy for 6:00 am today. Patient denies any sob, difficulty breathing, dizziness, headache, vision changes, cp, palpations, v/d, fever, chills, numbness, tingling, urinary symptoms, LE swelling. pt states last meal was yesterday at 8pm. pt denies pregnancy at this time. Labs sent.

## 2023-12-07 NOTE — ED ADULT NURSE NOTE - NS ED NURSE DISCH DISPOSITION
Calcipotriene Counseling:  I discussed with the patient the risks of calcipotriene including but not limited to erythema, scaling, itching, and irritation. Admitted

## 2023-12-07 NOTE — DISCHARGE NOTE PROVIDER - NSDCCPTREATMENT_GEN_ALL_CORE_FT
Misti Clayton is a 37 year old woman  who presents today for routine gyn examination.  She is infrequently sexually active.  Patient is currently using birth control pills for birth control.  In regards to her menstrual cycles, Misti reports no issues.  She has minimal if any  symptoms of dysmenorrhea. She reports low libido since her delivery. She has been on ocps since then and wants to know other options to see if these are interfering with her libido.     She denies any signs and symptoms of depression.  Has normal interest in everything else but reduced libido.  Has the same stable partner her .  Reports does not feel like having sex since her delivery.  Nothing else has changed.    PAST GYNECOLOGIC HISTORY:   No LMP recorded.     Pap smears have been normal  She has no family history of breast cancer. She is also instructed on self-breast exam.     PAST OBSTETRIC HISTORY:   OB History    Para Term  AB Living   1 1 1 0 0 1   SAB TAB Ectopic Molar Multiple Live Births   0 0 0 0 0 1       PAST MEDICAL HISTORY: No past medical history on file.     PAST SURGICAL HISTORY: No past surgical history on file.     SOCIAL HISTORY:   Social History     Socioeconomic History   • Marital status: /Civil Union     Spouse name: Not on file   • Number of children: Not on file   • Years of education: Not on file   • Highest education level: Not on file   Occupational History   • Not on file   Social Needs   • Financial resource strain: Not on file   • Food insecurity     Worry: Not on file     Inability: Not on file   • Transportation needs     Medical: Not on file     Non-medical: Not on file   Tobacco Use   • Smoking status: Never Smoker   • Smokeless tobacco: Never Used   Substance and Sexual Activity   • Alcohol use: No     Frequency: Never     Drinks per session: 1 or 2     Binge frequency: Never   • Drug use: No   • Sexual activity: Yes   Lifestyle   • Physical activity     Days  per week: Not on file     Minutes per session: Not on file   • Stress: Not on file   Relationships   • Social connections     Talks on phone: Not on file     Gets together: Not on file     Attends Judaism service: Not on file     Active member of club or organization: Not on file     Attends meetings of clubs or organizations: Not on file     Relationship status: Not on file   • Intimate partner violence     Fear of current or ex partner: Not on file     Emotionally abused: Not on file     Physically abused: Not on file     Forced sexual activity: Not on file   Other Topics Concern   • Not on file   Social History Narrative   • Not on file     Review of patient's social economics indicates:  No social economics status on file      FAMILY HISTORY:  No family history on file.    REVIEW OF SYSTEMS:   CONSTITUTIONAL: Denies fever/sweats and unintentional weight change.  CARDIOVASCULAR: Denies chest discomfort with exertion, SOB (shortness of breath) or palpitations.   RESPIRATORY: Denies cough, SOB, or change in exercise tolerance.   GASTROINTESTINAL: Denies abdominal pain, nausea, change in bowel habits, or melena.   GENITOURINARY: Denies frequency, dysuria, abnormal vaginal discharge/odor, or abnormal vaginal bleeding.     PHYSICAL EXAM:  Visit Vitals  /80   Wt 66 kg   BMI 26.63 kg/m²       GENERAL: Alert and oriented. Mood appropriate.  BREASTS: Without masses or nipple discharge bilaterally. Breast self-exam reviewed and encouraged.  ABDOMEN: Soft, nondistended and nontender.  PELVIC EXAM: External genitalia: No lesions or masses noted.  VAGINA: Normal mucosa and no exudate present.  CERVIX: Normal, without  lesions or masses  UTERUS: mobile , firm,non-tender, anteverted, normal size   ADNEXA: No masses  and no tenderness.  RECTOVAGINAL: not performed.    ASSESSMENT:  · Normal GYN (gynecological) exam  · Decreased libido    PLAN:  · Pap smear today. Went over ACOG (American Congress of Obstetricians and  Gynecologists) Pap guidelines with patient.   · STD (sexually transmitted disease) screening performed: not performed per patient request   · Risks benefits and alternatives of other contraceptive options discussed with the patient.  She is thinking about Kyleena IUD.  Wants to try something else to see if her libido is improved after stopping the pills.  · She will call us if she desires IUD placement  · Return to clinic in 1 year or as needed.             PRINCIPAL PROCEDURE  Procedure: Laparoscopic cholecystectomy  Findings and Treatment:

## 2023-12-07 NOTE — ED PROVIDER NOTE - NS ED ROS FT
CONST: no fevers, no chills  EYES: no pain, no vision changes  ENT: no sore throat, no ear pain, no change in hearing  CV: no chest pain, no leg swelling  RESP: no shortness of breath, no cough  ABD: (+) abdominal pain, (*+) nausea, no vomiting, no diarrhea  : no dysuria, no flank pain, no hematuria  MSK: no back pain, no extremity pain  NEURO: no headache or additional neurologic complaints  HEME: no easy bleeding  SKIN:  no rash

## 2023-12-07 NOTE — H&P ADULT - PROBLEM SELECTOR PLAN 1
-Admit patient  -OR today for laparoscopic cholecystectomy, possible open   -IV antibiotics  -Pre-op labs: cbc, cmp, coags, type and screen  -NPO, IVF  -Pain management, Zofran prn  -Dvt ppx  -Discussed with Dr. Crawford

## 2023-12-07 NOTE — DISCHARGE NOTE PROVIDER - NSDCFUADDINST_GEN_ALL_CORE_FT
ice packs on-off for 24hrs    f/u 1 week with Dr. Crawford    shower after 24hrs    Take 1000mg tylenol + 400mg motrin or advil every 6hrs as needed for pain

## 2023-12-07 NOTE — H&P ADULT - NSHPREVIEWOFSYSTEMS_GEN_ALL_CORE
REVIEW OF SYSTEMS:  CONSTITUTIONAL: No fever, weight loss, or fatigue  EYES: No eye pain, visual disturbances, discharge  ENMT:  No difficulty hearing, tinnitus, vertigo; No sinus or throat pain  NECK: No pain or stiffness  BREASTS: No pain, masses, or nipple discharge  RESPIRATORY: No cough, wheezing, chills or hemoptysis; No shortness of breath  CARDIOVASCULAR: No chest pain, palpitations, dizziness, or leg swelling  GASTROINTESTINAL: +RUQ pain and nausea. No hematemesis; No diarrhea or constipation. No melena or hematochezia.  GENITOURINARY: No dysuria, frequency, hematuria, or incontinence  NEUROLOGICAL: No headaches, memory loss, loss of strength, numbness, or tremors  SKIN: No itching, burning, rashes, or lesions   LYMPH NODES: No enlarged glands  ENDOCRINE: No heat or cold intolerance; No hair loss  MUSCULOSKELETAL: No joint pain or swelling; No muscle, back, or extremity pain  PSYCHIATRIC: No depression, anxiety, mood swings, or difficulty sleeping  HEME/LYMPH: No easy bruising, or bleeding gums  ALLERY AND IMMUNOLOGIC: No hives or eczema

## 2023-12-07 NOTE — PATIENT PROFILE ADULT - FALL HARM RISK - RISK INTERVENTIONS
Assistance OOB with selected safe patient handling equipment/Assistance with ambulation/Communicate Fall Risk and Risk Factors to all staff, patient, and family/Monitor gait and stability/Reinforce activity limits and safety measures with patient and family/Sit up slowly, dangle for a short time, stand at bedside before walking/Use of alarms - bed, chair and/or voice tab/Visual Cue: Yellow wristband/Bed in lowest position, wheels locked, appropriate side rails in place/Call bell, personal items and telephone in reach/Instruct patient to call for assistance before getting out of bed or chair/Non-slip footwear when patient is out of bed/[place] to call system/Physically safe environment - no spills, clutter or unnecessary equipment/Purposeful Proactive Rounding/Room/bathroom lighting operational, light cord in reach Assistance OOB with selected safe patient handling equipment/Assistance with ambulation/Communicate Fall Risk and Risk Factors to all staff, patient, and family/Monitor gait and stability/Reinforce activity limits and safety measures with patient and family/Sit up slowly, dangle for a short time, stand at bedside before walking/Use of alarms - bed, chair and/or voice tab/Visual Cue: Yellow wristband/Bed in lowest position, wheels locked, appropriate side rails in place/Call bell, personal items and telephone in reach/Instruct patient to call for assistance before getting out of bed or chair/Non-slip footwear when patient is out of bed/Mobile to call system/Physically safe environment - no spills, clutter or unnecessary equipment/Purposeful Proactive Rounding/Room/bathroom lighting operational, light cord in reach

## 2023-12-07 NOTE — DISCHARGE NOTE PROVIDER - CARE PROVIDER_API CALL
Hua Crawford  Surgery  [address] [place] [address]  Phone: [phone]  Fax: [phone]  Follow Up Time:    Hua Crawford  Surgery  [address] [place] [address]  Phone: [phone]  Fax: [phone]  Follow Up Time:

## 2023-12-08 ENCOUNTER — TRANSCRIPTION ENCOUNTER (OUTPATIENT)
Age: 48
End: 2023-12-08

## 2023-12-08 VITALS
SYSTOLIC BLOOD PRESSURE: 131 MMHG | TEMPERATURE: 98 F | HEART RATE: 72 BPM | RESPIRATION RATE: 16 BRPM | DIASTOLIC BLOOD PRESSURE: 73 MMHG | OXYGEN SATURATION: 100 %

## 2023-12-08 LAB
SURGICAL PATHOLOGY STUDY: SIGNIFICANT CHANGE UP
SURGICAL PATHOLOGY STUDY: SIGNIFICANT CHANGE UP

## 2023-12-08 RX ORDER — OXYCODONE HYDROCHLORIDE 5 MG/1
1 TABLET ORAL
Qty: 8 | Refills: 0
Start: 2023-12-08

## 2023-12-08 RX ADMIN — SODIUM CHLORIDE 150 MILLILITER(S): 9 INJECTION, SOLUTION INTRAVENOUS at 03:02

## 2023-12-08 RX ADMIN — HEPARIN SODIUM 5000 UNIT(S): 5000 INJECTION INTRAVENOUS; SUBCUTANEOUS at 05:53

## 2023-12-08 RX ADMIN — Medication 15 MILLIGRAM(S): at 11:00

## 2023-12-08 RX ADMIN — Medication 50 MILLIGRAM(S): at 05:54

## 2023-12-08 RX ADMIN — Medication 15 MILLIGRAM(S): at 00:00

## 2023-12-08 RX ADMIN — Medication 15 MILLIGRAM(S): at 10:09

## 2023-12-08 RX ADMIN — Medication 75 MICROGRAM(S): at 05:54

## 2023-12-08 RX ADMIN — Medication 975 MILLIGRAM(S): at 05:55

## 2023-12-08 NOTE — DISCHARGE NOTE NURSING/CASE MANAGEMENT/SOCIAL WORK - PATIENT PORTAL LINK FT
You can access the FollowMyHealth Patient Portal offered by [facility] by registering at the following website: [email or web address]. By joining [employer]’s FollowMyHealth portal, you will also be able to view your health information using other applications (apps) compatible with our system. You can access the FollowMyHealth Patient Portal offered by [facility] by registering at the following website: [email or web address]. By joining [employer]’s FollowMyHealth portal, you will also be able to view your health information using other applications (apps) compatible with our system.

## 2023-12-08 NOTE — DISCHARGE NOTE NURSING/CASE MANAGEMENT/SOCIAL WORK - NSDCPEFALRISK_GEN_ALL_CORE
For information on Fall & Injury Prevention, visit: [email or web address] OR  [email or web address] OR  https://www.cdc.gov/steadi/patient.html For information on Fall & Injury Prevention, visit: [email or web address] OR  [email or web address] OR  https://www.cdc.gov/steadi/patient.html

## 2023-12-08 NOTE — PROGRESS NOTE ADULT - SUBJECTIVE AND OBJECTIVE BOX
Postoperative Day #: 1 s/p lap cholecystectomy  Patient seen and examined bedside resting comfortably.  No complaints offered. + flatus,no BM.   Abdominal pain is well controlled.  Denies nausea, vomiting, diarrhea, fevers, chills. Tolerating regular diet.  ambulating without difficulty       T(F): 97.8 (12-08-23 @ 05:24), Max: 98.4 (12-07-23 @ 11:51)  HR: 60 (12-08-23 @ 05:24) (51 - 65)  BP: 163/90 (12-08-23 @ 05:24) (109/94 - 163/90)  RR: 18 (12-08-23 @ 05:24) (11 - 19)  SpO2: 99% (12-08-23 @ 05:24) (98% - 100%)  Wt(kg): --  CAPILLARY BLOOD GLUCOSE          PHYSICAL EXAM:  General: NAD  Neuro:  Alert & oriented x 3  HEENT: NCAT, EOMI, conjunctiva clear  CV: +S1+S2 regular rate and rhythm  Lung:respirations nonlabored, good inspiratory effort  Abdomen: soft, nondistended, laparoscopic incisions with steri strips. appropriate incisional tenderness   Extremities: no pedal edema or calf tenderness noted     LABS:                        11.7   5.12  )-----------( 229      ( 07 Dec 2023 06:31 )             35.7     12-07    141  |  108  |  11  ----------------------------<  87  3.6   |  29  |  0.66    Ca    9.4      07 Dec 2023 06:31    TPro  7.7  /  Alb  3.4  /  TBili  0.4  /  DBili  x   /  AST  16  /  ALT  15  /  AlkPhos  46  12-07    PT/INR - ( 07 Dec 2023 06:31 )   PT: 11.0 sec;   INR: 0.91 ratio         PTT - ( 07 Dec 2023 06:31 )  PTT:33.2 sec    I&O:         A/P: 47 yo F POD1 s/p laparoscopic cholecystectomy   -d/c home, will Follow up in office in 1-2 weeks with Dr Crawford     
Post-op check    S/P laparoscopic cholecystectomy POD#0  Pt seen and examined at bedside. Admits to incisional pain well controlled with medication. Tolerating clear liquid diet. Had mild nausea post-op which resolved. Ambulating. Voiding. Denies chest pain, shortness of breath, vomiting, and dizziness.     Vital Signs Last 24 Hrs  T(F): 97.7 (12-07-23 @ 17:31), Max: 98.4 (12-07-23 @ 11:51)  HR: 56 (12-07-23 @ 17:31)  BP: 146/80 (12-07-23 @ 17:31)  RR: 18 (12-07-23 @ 17:31)  SpO2: 99% (12-07-23 @ 17:31)      CONSTITUTIONAL: Alert, NAD  RESPIRATORY: Clear to auscultation bilaterally, respirations nonlabored  CARDIOVASCULAR: S1S2, Regular rate and rhythm  GASTROINTESTINAL: Steri strips C/D/I, Nondistended, bowel sounds, soft, Appropriate incisional tenderness  MUSCULOSKELETAL: No calf tenderness, No edema      Assessment: 48F S/P laparoscopic cholecystectomy POD#0    Plan:  - low fat diet  - local wound care  - DVT prophylaxis, Incentive Spirometer, OOB, Ambulating, pain control  - f/u labs

## 2023-12-11 DIAGNOSIS — E03.9 HYPOTHYROIDISM, UNSPECIFIED: ICD-10-CM

## 2023-12-11 DIAGNOSIS — K80.00 CALCULUS OF GALLBLADDER WITH ACUTE CHOLECYSTITIS WITHOUT OBSTRUCTION: ICD-10-CM

## 2023-12-11 DIAGNOSIS — Z88.8 ALLERGY STATUS TO OTHER DRUGS, MEDICAMENTS AND BIOLOGICAL SUBSTANCES: ICD-10-CM

## 2023-12-11 DIAGNOSIS — I10 ESSENTIAL (PRIMARY) HYPERTENSION: ICD-10-CM

## 2023-12-11 DIAGNOSIS — Z80.3 FAMILY HISTORY OF MALIGNANT NEOPLASM OF BREAST: ICD-10-CM

## 2024-07-22 ENCOUNTER — APPOINTMENT (OUTPATIENT)
Dept: ORTHOPEDIC SURGERY | Facility: CLINIC | Age: 49
End: 2024-07-22
Payer: COMMERCIAL

## 2024-07-22 VITALS — SYSTOLIC BLOOD PRESSURE: 152 MMHG | HEART RATE: 49 BPM | DIASTOLIC BLOOD PRESSURE: 79 MMHG

## 2024-07-22 DIAGNOSIS — M47.816 SPONDYLOSIS W/OUT MYELOPATHY OR RADICULOPATHY, LUMBAR REGION: ICD-10-CM

## 2024-07-22 DIAGNOSIS — Q65.89 OTHER SPECIFIED CONGENITAL DEFORMITIES OF HIP: ICD-10-CM

## 2024-07-22 PROCEDURE — 72170 X-RAY EXAM OF PELVIS: CPT

## 2024-07-22 PROCEDURE — 72110 X-RAY EXAM L-2 SPINE 4/>VWS: CPT

## 2024-07-22 PROCEDURE — 99214 OFFICE O/P EST MOD 30 MIN: CPT

## 2024-07-22 NOTE — ADDENDUM
[FreeTextEntry1] :  I, Mar Villad, acted solely as a scribe for Dr. Ochoa Ding on this date 07/22/2024 .  All medical records entries made by the Scribe were at my Dr. Ochoa Ding direction and personally dictated by me on 07/22/2024. I have reviewed the chart and agree that the record accurately reflects my personal performance of the history, physical exam, assessment and plan. I have also personally directed, reviewed, and agreed with the chart.

## 2024-07-22 NOTE — PHYSICAL EXAM
[1+] : left ankle jerk 1+ [SLR] : negative straight leg raise [LE] : Sensory: Intact in bilateral lower extremities [Normal RLE] : Right Lower Extremity: No scars, rashes, lesions, ulcers, skin intact [Normal LLE] : Left Lower Extremity: No scars, rashes, lesions, ulcers, skin intact [Obese] : obese [Normal] : Oriented to person, place, and time, insight and judgement were intact and the affect was normal [de-identified] : mild postive moi on the left negative FABERs on the left [de-identified] :  AP & lateral Flexion and Extension X-Rays of the Lumbar spine taken 07/22/2024 shows Lumabr traction osteophyte L3- 4, Spondylolisthesis L4-5 and mildly advancing Spondylolisthesis L4-5 compared to 6/2020   AP & lateral Flexion and Extension X-Rays of the Pelvis taken 07/22/2024 shows mild bilateral hip degenerate arthritis, mild hip dysplasia findings and compared to 2020 mildly advancing arthritis of both hips.

## 2024-07-22 NOTE — DISCUSSION/SUMMARY
[4 Weeks] : in 4 weeks [de-identified] :  I have discussed the underlying pathophysiology of the patient's condition. I highly recommend that the patient starts a course of physical therapy at this time. I have provided the patient with a detailed prescription for physical therapy. The patient should follow up with me in 1 month for further assessment of symptoms and discusiion of possible MRI.

## 2024-07-22 NOTE — HISTORY OF PRESENT ILLNESS
[Worsening] : worsening [Constant] : ~He/She~ states the symptoms seem to be constant [de-identified] :   A 49-Year-old female is here today for a repeat visit for back pain. Patient was last seen in 2020. Patient has hx of thyroid disease and patient notes recent change in thyroid medication. She states that she is constant pain all the time. Patient notes that pain is mostly on the right side and radiating pian in the right lower extremities. She notes there is buckling and giving away of the leg. Patient notes she has epidural injection 3 years ago and noticed slight improvement. She reports no recent history of trauma or injury.

## 2024-10-28 ENCOUNTER — APPOINTMENT (OUTPATIENT)
Dept: ORTHOPEDIC SURGERY | Facility: CLINIC | Age: 49
End: 2024-10-28
Payer: COMMERCIAL

## 2024-10-28 DIAGNOSIS — M43.17 SPONDYLOLISTHESIS, LUMBOSACRAL REGION: ICD-10-CM

## 2024-10-28 PROCEDURE — 99214 OFFICE O/P EST MOD 30 MIN: CPT

## 2024-10-28 RX ORDER — GABAPENTIN 300 MG/1
300 CAPSULE ORAL
Qty: 60 | Refills: 2 | Status: ACTIVE | COMMUNITY
Start: 2024-10-28 | End: 1900-01-01

## 2024-10-28 RX ORDER — METHYLPREDNISOLONE 4 MG/1
4 TABLET ORAL
Qty: 1 | Refills: 0 | Status: ACTIVE | COMMUNITY
Start: 2024-10-28 | End: 1900-01-01

## 2024-12-07 ENCOUNTER — APPOINTMENT (OUTPATIENT)
Dept: MRI IMAGING | Facility: CLINIC | Age: 49
End: 2024-12-07
Payer: COMMERCIAL

## 2024-12-07 PROCEDURE — 72148 MRI LUMBAR SPINE W/O DYE: CPT

## 2024-12-23 ENCOUNTER — APPOINTMENT (OUTPATIENT)
Dept: ORTHOPEDIC SURGERY | Facility: CLINIC | Age: 49
End: 2024-12-23
Payer: COMMERCIAL

## 2024-12-23 DIAGNOSIS — M43.17 SPONDYLOLISTHESIS, LUMBOSACRAL REGION: ICD-10-CM

## 2024-12-23 PROCEDURE — 99214 OFFICE O/P EST MOD 30 MIN: CPT

## (undated) DEVICE — TROCAR COVIDIEN VERSAONE BLADELESS FIXATION 5MM STANDARD

## (undated) DEVICE — WARMING BLANKET UPPER ADULT

## (undated) DEVICE — SUT VICRYL 0 27" UR-6

## (undated) DEVICE — PACK GENERAL LAPAROSCOPY

## (undated) DEVICE — TUBING STRYKEFLOW II SUCTION / IRRIGATOR

## (undated) DEVICE — SUT VICRYL 3-0 27" SH UNDYED

## (undated) DEVICE — DRSG STERISTRIPS 0.25 X 3"

## (undated) DEVICE — SHEARS COVIDIEN ENDO SHEAR 5MM X 31CM W UNIPOLAR CAUTERY

## (undated) DEVICE — SOL INJ NS 0.9% 1000ML

## (undated) DEVICE — ENDOCATCH 10MM SPECIMEN POUCH

## (undated) DEVICE — SYR LUER LOK 10CC

## (undated) DEVICE — DRAPE 3/4 SHEET W REINFORCEMENT 56X77"

## (undated) DEVICE — BLADE SURGICAL #15 CARBON

## (undated) DEVICE — SOL IRR POUR NS 0.9% 1000ML

## (undated) DEVICE — SYR LUER LOK 20CC

## (undated) DEVICE — VENODYNE/SCD SLEEVE CALF MEDIUM

## (undated) DEVICE — DRAPE TOWEL BLUE 17" X 24"

## (undated) DEVICE — SUT MONOCRYL 4-0 27" PS-2 UNDYED

## (undated) DEVICE — TROCAR APPLIED MEDICAL KII BALLOON BLUNT TIP 12MM X 100MM

## (undated) DEVICE — GLV 7.5 PROTEXIS (WHITE)